# Patient Record
Sex: FEMALE | Race: WHITE | Employment: OTHER | URBAN - METROPOLITAN AREA
[De-identification: names, ages, dates, MRNs, and addresses within clinical notes are randomized per-mention and may not be internally consistent; named-entity substitution may affect disease eponyms.]

---

## 2018-01-26 PROBLEM — E11.42 DIABETIC POLYNEUROPATHY ASSOCIATED WITH TYPE 2 DIABETES MELLITUS (HCC): Status: ACTIVE | Noted: 2018-01-26

## 2018-01-26 PROBLEM — I70.0 AORTIC ATHEROSCLEROSIS (HCC): Status: ACTIVE | Noted: 2018-01-26

## 2018-01-26 PROBLEM — F32.4 MAJOR DEPRESSIVE DISORDER WITH SINGLE EPISODE, IN PARTIAL REMISSION (HCC): Status: ACTIVE | Noted: 2018-01-26

## 2018-01-26 PROBLEM — Z85.3 HISTORY OF BREAST CANCER: Status: ACTIVE | Noted: 2018-01-26

## 2018-01-26 PROBLEM — L89.610 PRESSURE SORE ON HEEL, RIGHT, UNSTAGEABLE (HCC): Status: ACTIVE | Noted: 2018-01-26

## 2019-05-02 ENCOUNTER — HOSPITAL ENCOUNTER (EMERGENCY)
Facility: HOSPITAL | Age: 84
Discharge: HOME | End: 2019-05-02
Attending: EMERGENCY MEDICINE
Payer: MEDICARE

## 2019-05-02 VITALS
SYSTOLIC BLOOD PRESSURE: 155 MMHG | WEIGHT: 145 LBS | BODY MASS INDEX: 25.69 KG/M2 | DIASTOLIC BLOOD PRESSURE: 64 MMHG | HEART RATE: 95 BPM | OXYGEN SATURATION: 99 % | TEMPERATURE: 98 F | RESPIRATION RATE: 20 BRPM | HEIGHT: 63 IN

## 2019-05-02 DIAGNOSIS — L02.91 ABSCESS: Primary | ICD-10-CM

## 2019-05-02 PROCEDURE — 10060 I&D ABSCESS SIMPLE/SINGLE: CPT | Performed by: EMERGENCY MEDICINE

## 2019-05-02 PROCEDURE — 99283 EMERGENCY DEPT VISIT LOW MDM: CPT | Performed by: EMERGENCY MEDICINE

## 2019-05-02 RX ORDER — LIDOCAINE HYDROCHLORIDE AND EPINEPHRINE 20; 5 MG/ML; UG/ML
1 INJECTION, SOLUTION EPIDURAL; INFILTRATION; INTRACAUDAL; PERINEURAL ONCE
Status: DISCONTINUED | OUTPATIENT
Start: 2019-05-02 | End: 2019-05-02

## 2019-05-02 RX ORDER — LIDOCAINE HYDROCHLORIDE AND EPINEPHRINE 20; 5 MG/ML; UG/ML
INJECTION, SOLUTION EPIDURAL; INFILTRATION; INTRACAUDAL; PERINEURAL
Status: COMPLETED
Start: 2019-05-02 | End: 2019-05-02

## 2019-05-02 RX ORDER — SULFAMETHOXAZOLE AND TRIMETHOPRIM 800; 160 MG/1; MG/1
1 TABLET ORAL 2 TIMES DAILY
Qty: 10 TABLET | Refills: 0 | Status: SHIPPED | OUTPATIENT
Start: 2019-05-02 | End: 2019-05-07

## 2019-05-02 RX ORDER — LIDOCAINE HYDROCHLORIDE AND EPINEPHRINE 20; 5 MG/ML; UG/ML
10 INJECTION, SOLUTION EPIDURAL; INFILTRATION; INTRACAUDAL; PERINEURAL ONCE
Status: COMPLETED | OUTPATIENT
Start: 2019-05-02 | End: 2019-05-02

## 2019-05-02 NOTE — ED PROVIDER NOTES
Patient Seen in: [facility] AND [facility] Emergency Department    History   Patient presents with:  Abscess (integumentary)    Stated Complaint: Skin Issue    HPI    She presents today to the emergency department with complaint of boil to the back.   She has had HPI.  Constitutional and vital signs reviewed. All other systems reviewed and negative except as noted above. PSFH elements reviewed from today and agreed except as otherwise stated in HPI.     Physical Exam     ED Triage Vitals [05/02/19 1110]   BP MDM     99% Normal  Pulse oximetry         Disposition and Plan     We recommend that you schedule follow up care with a primary care provider within the next three months to obtain basic health screening including reassessment of your blood pressure.

## 2019-05-02 NOTE — ED NOTES
Abscess/swelling noted to mid back, erythema noted as well, patient denies fever, pain with palpation to abscess

## 2019-06-01 ENCOUNTER — APPOINTMENT (OUTPATIENT)
Dept: GENERAL RADIOLOGY | Facility: HOSPITAL | Age: 84
DRG: 871 | End: 2019-06-01
Attending: EMERGENCY MEDICINE
Payer: MEDICARE

## 2019-06-01 ENCOUNTER — HOSPITAL ENCOUNTER (INPATIENT)
Facility: HOSPITAL | Age: 84
LOS: 5 days | Discharge: NURSING, REHAB OR LONG-TERM CARE | DRG: 871 | End: 2019-06-06
Attending: EMERGENCY MEDICINE | Admitting: INTERNAL MEDICINE
Payer: MEDICARE

## 2019-06-01 ENCOUNTER — APPOINTMENT (OUTPATIENT)
Dept: CT IMAGING | Facility: HOSPITAL | Age: 84
DRG: 871 | End: 2019-06-01
Attending: EMERGENCY MEDICINE
Payer: MEDICARE

## 2019-06-01 DIAGNOSIS — R65.20 SEVERE SEPSIS (HCC): Primary | ICD-10-CM

## 2019-06-01 DIAGNOSIS — A41.9 SEVERE SEPSIS (HCC): Primary | ICD-10-CM

## 2019-06-01 PROCEDURE — 74177 CT ABD & PELVIS W/CONTRAST: CPT | Performed by: EMERGENCY MEDICINE

## 2019-06-01 PROCEDURE — 87070 CULTURE OTHR SPECIMN AEROBIC: CPT | Performed by: HOSPITALIST

## 2019-06-01 PROCEDURE — 85025 COMPLETE CBC W/AUTO DIFF WBC: CPT | Performed by: EMERGENCY MEDICINE

## 2019-06-01 PROCEDURE — 87641 MR-STAPH DNA AMP PROBE: CPT | Performed by: HOSPITALIST

## 2019-06-01 PROCEDURE — 93010 ELECTROCARDIOGRAM REPORT: CPT | Performed by: EMERGENCY MEDICINE

## 2019-06-01 PROCEDURE — 96361 HYDRATE IV INFUSION ADD-ON: CPT

## 2019-06-01 PROCEDURE — 80076 HEPATIC FUNCTION PANEL: CPT | Performed by: EMERGENCY MEDICINE

## 2019-06-01 PROCEDURE — 93005 ELECTROCARDIOGRAM TRACING: CPT

## 2019-06-01 PROCEDURE — 96367 TX/PROPH/DG ADDL SEQ IV INF: CPT

## 2019-06-01 PROCEDURE — 81001 URINALYSIS AUTO W/SCOPE: CPT | Performed by: EMERGENCY MEDICINE

## 2019-06-01 PROCEDURE — 83690 ASSAY OF LIPASE: CPT | Performed by: EMERGENCY MEDICINE

## 2019-06-01 PROCEDURE — 87077 CULTURE AEROBIC IDENTIFY: CPT | Performed by: EMERGENCY MEDICINE

## 2019-06-01 PROCEDURE — 99291 CRITICAL CARE FIRST HOUR: CPT

## 2019-06-01 PROCEDURE — 87205 SMEAR GRAM STAIN: CPT | Performed by: HOSPITALIST

## 2019-06-01 PROCEDURE — 96375 TX/PRO/DX INJ NEW DRUG ADDON: CPT

## 2019-06-01 PROCEDURE — 82962 GLUCOSE BLOOD TEST: CPT

## 2019-06-01 PROCEDURE — 80048 BASIC METABOLIC PNL TOTAL CA: CPT | Performed by: EMERGENCY MEDICINE

## 2019-06-01 PROCEDURE — 87077 CULTURE AEROBIC IDENTIFY: CPT | Performed by: HOSPITALIST

## 2019-06-01 PROCEDURE — 87040 BLOOD CULTURE FOR BACTERIA: CPT | Performed by: EMERGENCY MEDICINE

## 2019-06-01 PROCEDURE — 87186 SC STD MICRODIL/AGAR DIL: CPT | Performed by: EMERGENCY MEDICINE

## 2019-06-01 PROCEDURE — 96365 THER/PROPH/DIAG IV INF INIT: CPT

## 2019-06-01 PROCEDURE — 83605 ASSAY OF LACTIC ACID: CPT | Performed by: EMERGENCY MEDICINE

## 2019-06-01 PROCEDURE — 83036 HEMOGLOBIN GLYCOSYLATED A1C: CPT | Performed by: HOSPITALIST

## 2019-06-01 PROCEDURE — 71045 X-RAY EXAM CHEST 1 VIEW: CPT | Performed by: EMERGENCY MEDICINE

## 2019-06-01 PROCEDURE — 36415 COLL VENOUS BLD VENIPUNCTURE: CPT

## 2019-06-01 PROCEDURE — 87150 DNA/RNA AMPLIFIED PROBE: CPT | Performed by: EMERGENCY MEDICINE

## 2019-06-01 RX ORDER — SODIUM CHLORIDE 0.9 % (FLUSH) 0.9 %
3 SYRINGE (ML) INJECTION AS NEEDED
Status: DISCONTINUED | OUTPATIENT
Start: 2019-06-01 | End: 2019-06-06

## 2019-06-01 RX ORDER — ONDANSETRON 2 MG/ML
4 INJECTION INTRAMUSCULAR; INTRAVENOUS ONCE
Status: COMPLETED | OUTPATIENT
Start: 2019-06-01 | End: 2019-06-01

## 2019-06-01 RX ORDER — CALCIUM CARBONATE 200(500)MG
1000 TABLET,CHEWABLE ORAL 3 TIMES DAILY PRN
Status: DISCONTINUED | OUTPATIENT
Start: 2019-06-01 | End: 2019-06-06

## 2019-06-01 RX ORDER — SODIUM CHLORIDE, SODIUM LACTATE, POTASSIUM CHLORIDE, CALCIUM CHLORIDE 600; 310; 30; 20 MG/100ML; MG/100ML; MG/100ML; MG/100ML
INJECTION, SOLUTION INTRAVENOUS CONTINUOUS
Status: DISCONTINUED | OUTPATIENT
Start: 2019-06-01 | End: 2019-06-03

## 2019-06-01 RX ORDER — DEXTROSE MONOHYDRATE 25 G/50ML
50 INJECTION, SOLUTION INTRAVENOUS AS NEEDED
Status: DISCONTINUED | OUTPATIENT
Start: 2019-06-01 | End: 2019-06-06

## 2019-06-01 RX ORDER — AMLODIPINE BESYLATE 5 MG/1
5 TABLET ORAL
Status: DISCONTINUED | OUTPATIENT
Start: 2019-06-02 | End: 2019-06-01

## 2019-06-01 RX ORDER — ENOXAPARIN SODIUM 100 MG/ML
30 INJECTION SUBCUTANEOUS EVERY 24 HOURS
Status: DISCONTINUED | OUTPATIENT
Start: 2019-06-01 | End: 2019-06-02

## 2019-06-01 RX ORDER — ACETAMINOPHEN 325 MG/1
650 TABLET ORAL EVERY 6 HOURS PRN
Status: DISCONTINUED | OUTPATIENT
Start: 2019-06-01 | End: 2019-06-06

## 2019-06-01 RX ORDER — METOCLOPRAMIDE HYDROCHLORIDE 5 MG/ML
10 INJECTION INTRAMUSCULAR; INTRAVENOUS ONCE
Status: COMPLETED | OUTPATIENT
Start: 2019-06-01 | End: 2019-06-01

## 2019-06-01 RX ORDER — ACETAMINOPHEN 10 MG/ML
1000 INJECTION, SOLUTION INTRAVENOUS ONCE
Status: COMPLETED | OUTPATIENT
Start: 2019-06-01 | End: 2019-06-01

## 2019-06-01 RX ORDER — CALCIUM CARBONATE 200(500)MG
2 TABLET,CHEWABLE ORAL EVERY 6 HOURS PRN
COMMUNITY

## 2019-06-01 RX ORDER — POTASSIUM CHLORIDE 14.9 MG/ML
20 INJECTION INTRAVENOUS ONCE
Status: COMPLETED | OUTPATIENT
Start: 2019-06-02 | End: 2019-06-02

## 2019-06-01 RX ORDER — ONDANSETRON 2 MG/ML
4 INJECTION INTRAMUSCULAR; INTRAVENOUS EVERY 6 HOURS PRN
Status: DISCONTINUED | OUTPATIENT
Start: 2019-06-01 | End: 2019-06-06

## 2019-06-01 NOTE — ED PROVIDER NOTES
Patient Seen in: [facility] ER    History   Patient presents with:  Hypertension (cardiovascular)  Nausea/vomiting    Stated Complaint: vomiting    HPI    80year old with past medical history of diabetes, GERD, hypertension who presents with high Constitutional: She is oriented to person, place, and time. She appears well-developed and well-nourished. She is cooperative. Non-toxic appearance. She appears ill. She appears distressed.    Pt sitting up in bed dry-heaving into emesis basin with only ye LACTIC ACID, PLASMA - Abnormal; Notable for the following components:    Lactic Acid 4.3 (*)     All other components within normal limits   LACTIC ACID 3 HR POST POSITIVE - Abnormal; Notable for the following components:    Lactic Acid 2.3 (*)     All oth , sinus, normal for rate and rhythm     Radiology findings: Xr Chest Ap Portable  (cpt=71045)    Result Date: 6/1/2019  CONCLUSION:  1. No acute airspace disease. Mild bibasilar scarring/atelectasis. Mild cardiomegaly and normal pulmonary vascularity.   [place] CONCLUSION:  1. Long segment colonic wall thickening extending from the cecum through the splenic flexure, suspicious for infectious/inflammatory colitis given clinical context.   There are additional findings that can be seen in the setting of infectious/i Critical Care:  I spent a total of 30 minutes of critical care time in obtaining history, performing a physical exam, bedside monitoring of interventions, collecting and interpreting tests and discussion with consultants but not including time spent perform sodium chloride 0.9% IV bolus 1,974 mL (0 mL/kg × 65.8 kg Intravenous Stopped 6/1/19 1514)   Metoclopramide HCl (REGLAN) injection 10 mg (10 mg Intravenous Given 6/1/19 1129)   acetaminophen (OFIRMEV) infusion 1,000 mg (1,000 mg Intravenous Given 6/1/19 11 * (Principal) Severe sepsis ([facility].) A41.9, R65.20 6/1/2019 Unknown            Present on Admission  Date Reviewed: 1/27/2018          ICD-10-CM Noted POA    * (Principal) Severe sepsis ([facility].) A41.9, R65.20 6/1/2019 Unknown

## 2019-06-01 NOTE — ED NOTES
PATIENT DENIES NAUSEA/VOMITING AT THIS TIME.  VSS. FAMILY AT THE BEDSIDE. PATIENT DENIES PAIN. IVF NS CONTINUE INFUSING. WILL CONTINUE TO MONITOR AND REPEAT LACTIC AT 1400.

## 2019-06-01 NOTE — ED INITIAL ASSESSMENT (HPI)
Patient sent from [place] [id number] for elevated bp and \"shaking\" \  On arrival to ED patient is complaining of nausea and vomited here. Patient denies any pain. No sob. No chest pain. Patient is a/ox4.

## 2019-06-01 NOTE — ED NOTES
PATIENT TO GO TO CT VIA CART WITH NIXON RN AND TRANSPORTER. Ctra. Orlando Ferraro CCU RN NOTIFIED.    AFTER CT PATIENT WILL GO TO UNIT

## 2019-06-01 NOTE — PROGRESS NOTES
[facility] Pharmacy Note:  Renal Dose Adjustment for Enoxaparin (LOVENOX)    Carisa Brown has been prescribed Enoxaparin (LOVENOX) 40 mg subcutaneously every 24 hours. Estimated Creatinine Clearance: 22.2 mL/min (A) (based on SCr of 1.14 mg/dL (H)).     Her ca

## 2019-06-01 NOTE — PROGRESS NOTES
[facility]      Sepsis Reassessment Note    /50   Pulse 94   Temp 99.3 °F (37.4 °C) (Oral)   Resp 23   Ht 5' 3\" (1.6 m)   Wt 145 lb (65.8 kg)   SpO2 90%   BMI 25.69 kg/m²      3:28 PM    Cardiac:  Regularity: Regular  Rate: Hattie Hanson

## 2019-06-01 NOTE — CONSULTS
[facility] Pulmonary, Critical Care and Sleep    Carolina Landin Patient Status:  Inpatient    1920 MRN [id number]   Location 215-A PCP Vonna Apgar, MD     Date of Admission: 2019  History of Present Illness: Pt is a 80year old female con Ondansetron HCl (ZOFRAN) 4 mg tablet Take 4 mg by mouth every 6 (six) hours as needed for Nausea.    Disp:  Rfl:      Inpt meds:  • enoxaparin  30 mg Subcutaneous Q24H   • piperacillin-tazobactam  3.375 g Intravenous Q8H   • Insulin Aspart Pen  1-5 Units Mims Anion Gap 13 0 - 18 mmol/L    BUN 14 7 - 18 mg/dL    Creatinine 1.14 (H) 0.55 - 1.02 mg/dL    BUN/CREA Ratio 12.3 10.0 - 20.0    Calcium, Total 9.0 8.5 - 10.1 mg/dL    Calculated Osmolality 293 275 - 295 mOsm/kg    GFR, Non- 40 (L) >=60 Urine Color Yellow Yellow    Clarity Urine Clear Clear    Spec Gravity 1.013 1.002 - 1.035    pH Urine 6.0 5.0 - 8.0    Protein Urine 100  (A) Negative mg/dL    Glucose Urine 150  (A) Negative mg/dL    Ketones Urine Trace (A) Negative mg/dL    Bilirubin U 6. Approximate 1 cm additional cystic pancreatic body lesion.  Differential considerations include a pseudocyst (particularly if there is history of pancreatitis), cystic neoplasm, or dilated side branch radical.  Suggest follow-up MRCP (depending upon   go

## 2019-06-01 NOTE — H&P
[facility] Hospitalist H&P       CC: Patient presents with:  Hypertension (cardiovascular)  Nausea/vomiting       PCP: Stone Jacques MD    History of Present Illness: Patient is a 80year old female with PMH sig for HTN, GERD, DM (no longer on meds) wh Soc Hx  Social History    Tobacco Use      Smoking status: Never Smoker      Smokeless tobacco: Never Used    Alcohol use: No       Fam Hx  History reviewed. No pertinent family history.     Review of Systems  Comprehensive ROS reviewed and negative ASSESSMENT / PLAN:   Patient is a 80year old female with PMH sig for HTN, GERD, DM (no longer on meds) who presents with fevers, and rigors. Severe sepsis / wound infection?  Vs other / lactic acidosis / leucocytosis   - Fever, Tachy, WBC14,

## 2019-06-01 NOTE — PROGRESS NOTES
[facility] Dosing Service    Initial Pharmacokinetic Consult for Vancomycin Dosing     Catia Gonzalez is a 80year old female who is being treated for sepsis.   Pharmacy has been asked to dose Vancomycin by Dr. Sean De Luna    She is allergic to cefuroxime and phe

## 2019-06-01 NOTE — ED NOTES
REPORT GIVEN TO Ctra. Orlando Ferraro CCU RN. TRANSPORT REQUESTED.   PATIENT FAMILY AT THE BEDSIDE AND UPDATED ON TRANSFER TO CCU

## 2019-06-02 PROCEDURE — 87493 C DIFF AMPLIFIED PROBE: CPT | Performed by: HOSPITALIST

## 2019-06-02 PROCEDURE — 82962 GLUCOSE BLOOD TEST: CPT

## 2019-06-02 PROCEDURE — 83036 HEMOGLOBIN GLYCOSYLATED A1C: CPT | Performed by: HOSPITALIST

## 2019-06-02 PROCEDURE — 85025 COMPLETE CBC W/AUTO DIFF WBC: CPT | Performed by: HOSPITALIST

## 2019-06-02 PROCEDURE — 83605 ASSAY OF LACTIC ACID: CPT | Performed by: INTERNAL MEDICINE

## 2019-06-02 PROCEDURE — 87040 BLOOD CULTURE FOR BACTERIA: CPT | Performed by: HOSPITALIST

## 2019-06-02 PROCEDURE — 83735 ASSAY OF MAGNESIUM: CPT | Performed by: HOSPITALIST

## 2019-06-02 PROCEDURE — 80053 COMPREHEN METABOLIC PANEL: CPT | Performed by: HOSPITALIST

## 2019-06-02 RX ORDER — HEPARIN SODIUM 5000 [USP'U]/ML
5000 INJECTION, SOLUTION INTRAVENOUS; SUBCUTANEOUS EVERY 12 HOURS SCHEDULED
Status: DISCONTINUED | OUTPATIENT
Start: 2019-06-03 | End: 2019-06-06

## 2019-06-02 RX ORDER — MAGNESIUM SULFATE HEPTAHYDRATE 40 MG/ML
2 INJECTION, SOLUTION INTRAVENOUS ONCE
Status: COMPLETED | OUTPATIENT
Start: 2019-06-02 | End: 2019-06-02

## 2019-06-02 NOTE — PLAN OF CARE
Problem: SAFETY ADULT - FALL  Goal: Free from fall injury  Description  INTERVENTIONS:  - Assess pt frequently for physical needs  - Identify cognitive and physical deficits and behaviors that affect risk of falls.   - [place] fall precautions as indica urine. Given tylenol for sleep and effective. Plan of care addressed with limited understanding.

## 2019-06-02 NOTE — PLAN OF CARE
Double RN skin check done prior to transfer off Unit. Skin check performed by this RN and Jen Billings RN. Wounds are as follows: Abscess with penrose drain to left back. Dressing C/D/I, excoriation/redness to lynn/buttock area with sensicare.  Dry callous to

## 2019-06-02 NOTE — PLAN OF CARE
Patient admitted from ED to room 215. Turning in bed with 2 assist. Patient is very afraid of falling, which son's state is 2/2 her falling in the past. Abcess with penrose drain noted to left back. Wound culture sent and dressing applied. MRSA swab sent. document risk factors for pressure ulcer development  - Assess and document skin integrity  - Monitor for areas of redness and/or skin breakdown  - Initiate interventions, skin care algorithm/standards of care as needed  Outcome: Progressing     Problem: P

## 2019-06-02 NOTE — PROGRESS NOTES
[facility] Hospitalist Progress Note     CC: Hospital Follow up    PCP: Tamar Arndt MD       Assessment/Plan:     Principal Problem:    Severe sepsis [facility])    Patient is a 80year old female with PMH sig for HTN, GERD, DM (no longer on meds) who presen am    OBJECTIVE:    Blood pressure (!) 137/115, pulse 101, temperature 98 °F (36.7 °C), temperature source Temporal, resp. rate 18, height 5' 3\" (1.6 m), weight 160 lb (72.6 kg), SpO2 92 %.     Temp:  [97.9 °F (36.6 °C)-100.2 °F (37.9 °C)] 98 °F (36.7 °C) mass must be excluded. Recommend CT scanning with contrast to further assess. Moderate retrocardiac hiatal hernia.     Dictated by (CST): Alexa Ugarte MD on 6/01/2019 at 11:54     Approved by (CST): Alexa Ugarte MD on 6/01/2019 at 11:56          Ct Ab Meds:     • magnesium sulfate  2 g Intravenous Once   • enoxaparin  30 mg Subcutaneous Q24H   • piperacillin-tazobactam  3.375 g Intravenous Q8H   • Insulin Aspart Pen  1-5 Units Subcutaneous TID CC   • [START ON 6/3/2019] vancomycin  15 mg/kg Intr

## 2019-06-02 NOTE — PROGRESS NOTES
[facility] Pulmonary, Critical Care and Sleep    Fidelia Lobato Patient Status:  Inpatient    1920 MRN [id number]   Location [facility] 3W/SW Attending Maria Luz Delcid MD   Hosp Day # 1 PCP Claudean Palmer, MD       Date of Admission: 2019 10:4  110   CO2 23.0 22.0     No results for input(s): INR, PTT in the last 168 hours. No results for input(s): ABGPHT, QZSUWG9K, BPOIB1H, ABGHCO3, SITE, DEV, THGB in the last 168 hours. Pseudomonas PCR pos  Cx NGTD. ASSESSMENT/PLAN:  1.  Sepsis seco

## 2019-06-02 NOTE — CONSULTS
1915 Bridgette Ross  CTD:[birth year]  AWF:[id number]  LOS:1    Date of Admission:  6/1/2019  Date of Consult:  6/2/2019     Reas Continuous  •  acetaminophen (TYLENOL) tab 650 mg, 650 mg, Oral, Q6H PRN  •  ondansetron HCl (ZOFRAN) injection 4 mg, 4 mg, Intravenous, Q6H PRN  •  Piperacillin Sod-Tazobactam So (ZOSYN) 3.375 g in dextrose 5 % 100 mL ADD-vantage, 3.375 g, Intravenous, Q8 NEPRELIM 13.20* 10.11*   WBC 14.3* 11.1*   .0 182.0       Recent Labs   Lab 06/01/19  1056 06/02/19  0339   * 158*   BUN 14 10   CREATSERUM 1.14* 0.94   GFRAA 46* 58*   GFRNAA 40* 50*   CA 9.0 7.7*    139   K 3.2* 4.1    110   C interpolar hypodense renal lesion. Suggest nonemergent follow-up renal ultrasound to determine if this is solid or cystic. 6. Approximate 1 cm additional cystic pancreatic body lesion.  Differential considerations include a pseudocyst (particularly if ther AM                Time spent on counseling/coordination of care:  15 Minutes  Total time spent with patient:  30 Minutes

## 2019-06-02 NOTE — PLAN OF CARE
Patient looks and feels better this morning. IVF reduced to 42cc/hr. Patient tolerating clears and advanced per orders. Blood cultures + and relayed to Dr. Mario Shaw. Patient transferred to room 304. Son, Ed notified.  Patient sent with all belongings including breakdown  - Initiate interventions, skin care algorithm/standards of care as needed  Outcome: Progressing  Goal: Incision(s), wounds(s) or drain site(s) healing without S/S of infection  Description  INTERVENTIONS:  - Assess and document risk factors for

## 2019-06-02 NOTE — CONSULTS
[facility] AND [facility] Infectious Disease  Report of Consultation    Fidelia Lobato Patient Status:  Inpatient    1920 MRN [id number]   Location [facility] 2W/SW Attending Maria Luz Delcid MD   Hosp Day # 1 PCP Claudean Palmer, MD     Date Oral, Q6H PRN  •  ondansetron HCl (ZOFRAN) injection 4 mg, 4 mg, Intravenous, Q6H PRN  •  Piperacillin Sod-Tazobactam So (ZOSYN) 3.375 g in dextrose 5 % 100 mL ADD-vantage, 3.375 g, Intravenous, Q8H  •  dextrose 50 % injection 50 mL, 50 mL, Intravenous, MA °F (36.6 °C) Temporal 69 23 95 % — —   06/02/19 0300 93/47 — — 69 25 95 % — —   06/02/19 0200 103/56 — — 79 23 97 % — —   06/02/19 0100 133/65 — — 98 23 93 % — —   06/02/19 0000 117/60 99 °F (37.2 °C) Temporal 98 26 94 % — —   06/01/19 2300 124/65 — — 98 2 06/02/2019    ALKPHO 61 06/02/2019    BILT 0.8 06/02/2019    TP 5.6 06/02/2019    AST 37 06/02/2019    ALT 21 06/02/2019    LIP 98 06/01/2019    MG 1.7 06/02/2019        Cultures:   1/2 blood cultures + pseudomonas, repeats negative  Wound cultures negaitv recently drained back cyst - cultures currently negative and not obtained at the time of drainage vs. Other occult source  - IV vancomycin and zosyn ongoing - will streamline quickly    2.   CT evidence of long segment colonic thickening  - Surgery has eval

## 2019-06-02 NOTE — PLAN OF CARE
Report received from [facility] in ED. Patient given fluid bolus according to sepsis protocol and IV antibiotics vanco and zosyn were given after blood and urine culture obtained.

## 2019-06-03 ENCOUNTER — APPOINTMENT (OUTPATIENT)
Dept: GENERAL RADIOLOGY | Facility: HOSPITAL | Age: 84
DRG: 871 | End: 2019-06-03
Attending: HOSPITALIST
Payer: MEDICARE

## 2019-06-03 ENCOUNTER — APPOINTMENT (OUTPATIENT)
Dept: CV DIAGNOSTICS | Facility: HOSPITAL | Age: 84
DRG: 871 | End: 2019-06-03
Attending: HOSPITALIST
Payer: MEDICARE

## 2019-06-03 PROCEDURE — 80048 BASIC METABOLIC PNL TOTAL CA: CPT | Performed by: HOSPITALIST

## 2019-06-03 PROCEDURE — 82805 BLOOD GASES W/O2 SATURATION: CPT | Performed by: HOSPITALIST

## 2019-06-03 PROCEDURE — 85025 COMPLETE CBC W/AUTO DIFF WBC: CPT | Performed by: HOSPITALIST

## 2019-06-03 PROCEDURE — 93306 TTE W/DOPPLER COMPLETE: CPT | Performed by: HOSPITALIST

## 2019-06-03 PROCEDURE — 36600 WITHDRAWAL OF ARTERIAL BLOOD: CPT | Performed by: HOSPITALIST

## 2019-06-03 PROCEDURE — 94640 AIRWAY INHALATION TREATMENT: CPT

## 2019-06-03 PROCEDURE — 83880 ASSAY OF NATRIURETIC PEPTIDE: CPT | Performed by: HOSPITALIST

## 2019-06-03 PROCEDURE — 82962 GLUCOSE BLOOD TEST: CPT

## 2019-06-03 PROCEDURE — 71045 X-RAY EXAM CHEST 1 VIEW: CPT | Performed by: HOSPITALIST

## 2019-06-03 PROCEDURE — 83735 ASSAY OF MAGNESIUM: CPT | Performed by: HOSPITALIST

## 2019-06-03 RX ORDER — IPRATROPIUM BROMIDE AND ALBUTEROL SULFATE 2.5; .5 MG/3ML; MG/3ML
3 SOLUTION RESPIRATORY (INHALATION) EVERY 6 HOURS PRN
Status: DISCONTINUED | OUTPATIENT
Start: 2019-06-03 | End: 2019-06-06

## 2019-06-03 RX ORDER — FUROSEMIDE 10 MG/ML
40 INJECTION INTRAMUSCULAR; INTRAVENOUS
Status: DISCONTINUED | OUTPATIENT
Start: 2019-06-03 | End: 2019-06-03

## 2019-06-03 RX ORDER — FUROSEMIDE 10 MG/ML
20 INJECTION INTRAMUSCULAR; INTRAVENOUS ONCE
Status: COMPLETED | OUTPATIENT
Start: 2019-06-03 | End: 2019-06-03

## 2019-06-03 RX ORDER — FUROSEMIDE 10 MG/ML
20 INJECTION INTRAMUSCULAR; INTRAVENOUS
Status: DISCONTINUED | OUTPATIENT
Start: 2019-06-03 | End: 2019-06-06

## 2019-06-03 NOTE — PHYSICAL THERAPY NOTE
Pt was to be seen for PT eval. Consulted w/ RN. Visited pt in room. Pt was received supine in bed. Despite moderate education and encouragement, pt declined participating with therapy today.  Will re-attempt time-permitting if pt is agreeable to working wit

## 2019-06-03 NOTE — PROGRESS NOTES
[facility] Hospitalist Progress Note     CC: Hospital Follow up    PCP: Grant Saravia MD       Assessment/Plan:     Principal Problem:    Severe sepsis [facility])    Patient is a 80year old female with PMH sig for HTN, GERD, DM (no longer on meds) who presen heparin  Atrophy: IS  Lines: PIV     Dispo: pending clinical course    Questions/concerns were discussed with patient and/or family by bedside.     Thank Radha Ulloa MD    [facility] Hospitalist     Subjective:     Hypoxic last night and this am, denies feelin K 3.2* 4.1 3.9    110 105   CO2 23.0 22.0 24.0       Recent Labs   Lab 06/01/19  1056 06/02/19  0339   ALT 19 21   AST 22 37   ALB 3.4 2.4*         Imaging:  Xr Chest Ap Portable  (cpt=71045)    Result Date: 6/1/2019  CONCLUSION:  1.  No acute airsp of care. 8. Calcified/degenerated uterine fibroids. 9. Coronary and peripheral atherosclerosis. 10. Postsurgical changes of right proximal femoral internal fixation. Resultant streak artifacts limit assessment.  11. Chronic/healed left rib and left inferio

## 2019-06-03 NOTE — OCCUPATIONAL THERAPY NOTE
Orders received, chart reviewed. ELENA Ni cleared pt for participation in OT evaluation. Upon arrival, pt in bed. Pt SOB with talking. Pt strongly deferring activity at this time despite pt education on benefits for out of bed activity.  Pt repositioned in

## 2019-06-03 NOTE — PAYOR COMM NOTE
--------------  ADMISSION REVIEW     Payor: [facility] [place] #:  [id number]  Authorization Number: N/A    Admit date: 6/1/19  Admit time: 7810       Patient Seen in: [facility] AND [facility] ER    History   Patient presents with:  Hypertensio Neck: Normal range of motion and full passive range of motion without pain. Neck supple. No neck rigidity. Normal range of motion present. Cardiovascular: Regular rhythm, normal heart sounds and intact distal pulses. Tachycardia present.    No murmur hear EKG SR/ with PVC, poor baseline due to tremor    MDM   CXR: 1. No acute airspace disease. Mild bibasilar scarring/atelectasis. Mild cardiomegaly and normal pulmonary vascularity.   Moderate tracheal deviation to the left may be secondary to a subste Sepsis Reassessment Note    /53    Pulse 92   Temp 98.2  Resp 14   Ht 160 cm (5' 3\")   Wt 72.3 kg   SpO2 94%       Medications   Calcium Carbonate Antacid (TUMS) chewable tab 1,000 mg (has no administration in time range)   Normal Saline Flush 0.9 % The patient presents with signs of sepsis, fever, tachycardia, on labs she does have slightly elevated WBC as well as lactic acidosis. The patient's blood pressure remained stable and if anything was elevated.   The patient had significant improvement with Lab 06/01/19  1056   WBC 14.3*   HGB 12.2   MCV 84.8   .0     Lab 06/01/19  1056      K 3.2*      CO2 23.0   BUN 14   CREATSERUM 1.14*   *   CA 9.0     ALT 19   AST 22   ALB 3.4       ASSESSMENT / PLAN:   Patient is a 80year old - back wound with drain in place and active drainage, no pain with palpation, mild erythema surrounding, culture pending  - s/p IVF sepsis bolus, continue IVF, LA cleared  - IV vanc and IV zosyn for now  - follow cultures  - surgery eval, dressing recommen MEDICATIONS ADMINISTERED IN LAST 1 DAY:  acetaminophen (TYLENOL) tab 650 mg     Date Action Dose Route User    6/2/2019 2011 Given 650 mg Oral Harvey REYES RN      Calcium Carbonate Antacid (TUMS) chewable tab 1,000 mg     Date Action Dose Route User

## 2019-06-03 NOTE — PROGRESS NOTES
[facility] AND [facility] Infectious Disease  Progress Note    Priscilla Meter Patient Status:  Inpatient    1920 MRN [id number]   Location [facility] 3W/SW Attending Igor Lo MD   Hosp Day # 2 PCP Van Fonseca MD     Subjective:  Jessica Sandy retrocardiac hiatal hernia. 4. Cholecystectomy. 5. Indeterminate approximate 1 cm left lateral interpolar hypodense renal lesion.  Suggest nonemergent follow-up renal ultrasound to determine if this is solid or cystic.   6. Approximate 1 cm additional cys pending further data. Will follow. [facility] Infectious Disease  [phone]    6/3/2019  10:59 AM

## 2019-06-03 NOTE — DIETARY NOTE
ADULT NUTRITION INITIAL ASSESSMENT    Pt is at moderate nutrition risk. Pt does not meet malnutrition criteria.       RECOMMENDATIONS TO MD:  See Nutrition Intervention     NUTRITION DIAGNOSIS/PROBLEM:  Inadequate oral intake related to decreased appetite ANTHROPOMETRICS:  HT: 160 cm (5' 3\")  WT: 73.2 kg (161 lb 6.4 oz)   BMI: Body mass index is 28.59 kg/m².   BMI CLASSIFICATION: 27-29.9 kg/m2 - overweight for advanced age  IBW: 115 lbs        140% IBW  Usual Body Wt: 145 lbs       111% UBW    WEIGHT HI excoriation/fragile/red of perineum; posterior abcess drainage with penrose drain in place to back     - Anthony score 13 reflects high risk for skin breakdown     NUTRITION PRESCRIPTION:  Diet: Chopped  Oral Supplements: Ensure Enlive BID with breakfast an

## 2019-06-03 NOTE — PROGRESS NOTES
Pulmonary Progress Note     Assessment / Plan:  1. Acute respiratory failure - likely due to pulm edema. DDx also includes aspiration  - wean O2 as able; was up to 10L HFNC this morning  - IV lasix  - zosyn  2.  Sepsis - due to pseudomonas bacteremia  - abx

## 2019-06-03 NOTE — CM/SW NOTE
Case Management/ Progression of Care     Met with patient at the bedside for discharge planning. Patient is a 80year old female who lives at [facility].  [phone]   Patient was admitted thru the ED following a recent   Out devan

## 2019-06-04 ENCOUNTER — APPOINTMENT (OUTPATIENT)
Dept: PHYSICAL THERAPY | Facility: HOSPITAL | Age: 84
DRG: 871 | End: 2019-06-04
Attending: HOSPITALIST
Payer: MEDICARE

## 2019-06-04 PROCEDURE — 82962 GLUCOSE BLOOD TEST: CPT

## 2019-06-04 PROCEDURE — 84132 ASSAY OF SERUM POTASSIUM: CPT | Performed by: INTERNAL MEDICINE

## 2019-06-04 PROCEDURE — 80048 BASIC METABOLIC PNL TOTAL CA: CPT | Performed by: HOSPITALIST

## 2019-06-04 PROCEDURE — 83735 ASSAY OF MAGNESIUM: CPT | Performed by: HOSPITALIST

## 2019-06-04 PROCEDURE — 94640 AIRWAY INHALATION TREATMENT: CPT

## 2019-06-04 PROCEDURE — 85025 COMPLETE CBC W/AUTO DIFF WBC: CPT | Performed by: HOSPITALIST

## 2019-06-04 PROCEDURE — 97530 THERAPEUTIC ACTIVITIES: CPT

## 2019-06-04 PROCEDURE — 97166 OT EVAL MOD COMPLEX 45 MIN: CPT

## 2019-06-04 PROCEDURE — 97162 PT EVAL MOD COMPLEX 30 MIN: CPT

## 2019-06-04 RX ORDER — POTASSIUM CHLORIDE 20 MEQ/1
40 TABLET, EXTENDED RELEASE ORAL EVERY 4 HOURS
Status: COMPLETED | OUTPATIENT
Start: 2019-06-04 | End: 2019-06-04

## 2019-06-04 NOTE — PROGRESS NOTES
[facility] - [facility]    Progress Note    Carisa Kevin Patient Status:  Inpatient    1920 MRN [id number]   Location [facility] 3W/SW Attending Miguelito Jerome MD   Hosp Day # 3 PCP Benito Mandel MD            Subjective:     Stat 1.14* 0.94 0.96 1.02   GFRAA 46* 58* 57* 53*   GFRNAA 40* 50* 49* 46*   CA 9.0 7.7* 8.5 8.1*   ALB 3.4 2.4*  --   --     139 137 136   K 3.2* 4.1 3.9 3.3*    110 105 103   CO2 23.0 22.0 24.0 25.0   ALKPHO 92 61  --   --    AST 22 37  --   --

## 2019-06-04 NOTE — OCCUPATIONAL THERAPY NOTE
OCCUPATIONAL THERAPY EVALUATION - INPATIENT     Room Number: 920/789-W  Evaluation Date: 6/4/2019  Type of Evaluation: Initial  Presenting Problem: acute resp failure, sepsis    Physician Order: IP Consult to Occupational Therapy  Reason for Therapy: ADL/I with lift sheet and total A x 2. Pt reported she screamed because she was scared and a \"big baby\".      The patient's Approx Degree of Impairment: 56.46% has been calculated based on documentation in the [facility] '6 clicks' Inpatient Daily Activity Short For behavioral screaming?     Communication: wfl    Behavioral/Emotional/Social: wfl    RANGE OF MOTION   Upper extremity ROM is within functional limits except for the following: bilateral shoulders --per observation     STRENGTH ASSESSMENT  Upper extremity st

## 2019-06-04 NOTE — PLAN OF CARE
Problem: Patient/Family Goals  Goal: Patient/Family Long Term Goal  Description  Patient's Long Term Goal: To go home    Interventions:  - Admission for sepsis  - Cultures pending  - Consult to Pulmonary and Surgery  - IVF, Abx  - See additional Care Jeronimo Initiate interventions, skin care algorithm/standards of care as needed  Outcome: Progressing  Goal: Incision(s), wounds(s) or drain site(s) healing without S/S of infection  Description  INTERVENTIONS:  - Assess and document risk factors for pressure ulce

## 2019-06-04 NOTE — CM/SW NOTE
Case Management/ Progression of Care    Therapy is recommending [facility], tentative referral sent to   [facility] per family request.      DON [facility] from [facility] [place]  Patient and family aware and in agreement with referral sent.

## 2019-06-04 NOTE — PROGRESS NOTES
[facility] AND [facility] Infectious Disease  Progress Note    Donald Mendez Patient Status:  Inpatient    1920 MRN [id number]   Location [facility] 3W/SW Attending Ramsey Ying MD   Hosp Day # 3 PCP Napoleon Jackson MD     Subjective:  Louise Alanis evidence of acute diverticulitis. 3. Moderate to large retrocardiac hiatal hernia. 4. Cholecystectomy.   5. Indeterminate approximate 1 cm left lateral interpolar hypodense renal lesion.  Suggest nonemergent follow-up renal ultrasound to determine if this wound care ongoing. D/w patient.     [facility] Infectious Disease  [phone]    6/4/2019  1:19 PM

## 2019-06-04 NOTE — PHYSICAL THERAPY NOTE
PHYSICAL THERAPY EVALUATION - INPATIENT     Room Number: 375/429-X  Evaluation Date: 6/4/2019  Type of Evaluation: Initial   Physician Order: PT Eval and Treat    Presenting Problem: severe sepsis; recent cyst drained from back as an OPPT w/ small drain a recommending sub-acute rehab at hospital as pt was able to transfer to < > from wheelchair independently at baseline and she has been unable to demonstrate that she can do this now. Pt is below her functional mobility baseline.      Patient will benefit fro Prior Level of [place]: Per pt: Pt was able to transfer self to < > from wheelchair. Pt needs assistance for dressing, medication management, bathing, and meals. She is wheelchair bound.  She has been non-ambulatory for the last 2 years since her h Patient is able to demonstrate supine - sit EOB @ level: modified independent     Goal #1   Current Status    Goal #2 Patient is able to demonstrate transfers EOB to/from Chair/Wheelchair at assistance level: modified independent with none     Goal #2  Cur

## 2019-06-04 NOTE — PROGRESS NOTES
[facility] Hospitalist Progress Note     CC: Hospital Follow up    PCP: Johnathan Pemberton MD       Assessment/Plan:     Principal Problem:    Severe sepsis [facility])    Ms. Talmadge Skiff is a 80year old female with PMH sig for HTN, GERD, DM (no longer on meds) who pre PCP, can consider mammogram    #Renal and pancreatic cyst/mass  - consider MRI as outpatient after discussion with PCP     #HTN  - hold amlodipine, if needed would switch to BB at time of discahrge     #DM  - not on oral meds  -  on admit, likely due 84.0   MCH 27.4 27.3 27.3   MCHC 32.5 32.1 32.5   RDW 14.6 14.6 14.5   NEPRELIM 10.11* 9.80* 4.75   WBC 11.1* 11.0 6.8   .0 187.0 174.0         Recent Labs   Lab 06/02/19  0339 06/03/19  0551 06/04/19  0606   * 185* 160*   BUN 10 13 16   CREA

## 2019-06-04 NOTE — PROGRESS NOTES
Pulmonary Progress Note     Assessment / Plan:  1. Acute respiratory failure - due to pulm edema. Less likely aspiration pneumonia  - wean O2 as able  - IV lasix  - zosyn  2. HFpEF - TTE with grade 2 DD and severe MR  - diuresis  3.  Sepsis - due to pseudom

## 2019-06-05 PROCEDURE — 83735 ASSAY OF MAGNESIUM: CPT | Performed by: INTERNAL MEDICINE

## 2019-06-05 PROCEDURE — 80048 BASIC METABOLIC PNL TOTAL CA: CPT | Performed by: INTERNAL MEDICINE

## 2019-06-05 PROCEDURE — 82962 GLUCOSE BLOOD TEST: CPT

## 2019-06-05 PROCEDURE — 94640 AIRWAY INHALATION TREATMENT: CPT

## 2019-06-05 NOTE — PROGRESS NOTES
[facility]    Progress Note    Noni Canseco Patient Status:  Inpatient    1920 MRN [id number]   Location [facility] 3W/SW Attending Alcira Anderson MD   Hosp Day # 4 PCP Carter Rubio MD            Subjective:     Stat 252* 158* 185* 160*  --  180*   BUN 14 10 13 16  --  15   CREATSERUM 1.14* 0.94 0.96 1.02  --  1.00   GFRAA 46* 58* 57* 53*  --  54*   GFRNAA 40* 50* 49* 46*  --  47*   CA 9.0 7.7* 8.5 8.1*  --  8.6   ALB 3.4 2.4*  --   --   --   --     139 137 136

## 2019-06-05 NOTE — CARDIAC REHAB
CARDIAC REHAB HEART FAILURE EDUCATION    Handouts provided and reviewed: CHF Booklet. Activity: Chair for all meals: yes       Ambulation: n/a       Tolerated Activity: n/a         Disease Process: Disease process reviewed.     Reviewed the following: D

## 2019-06-05 NOTE — CM/SW NOTE
Case Management /Progression of Care  Informed by RN patient is declining , AUBRIE and would like to return to:  [facility]. [phone]  On discharge with therapy. Orders and Face to Face will be needed for Malu Kumar on discharge.    Lef

## 2019-06-05 NOTE — PROGRESS NOTES
[facility] Hospitalist Progress Note     CC: Hospital Follow up    PCP: Jorge Stout MD       Assessment/Plan:     Principal Problem:    Severe sepsis [facility])    Ms. Junior Crowell is a 80year old female with PMH sig for HTN, GERD, DM (no longer on meds) who pre discussion with PCP     #HTN  - hold amlodipine, if needed would switch to BB at time of discahrge     #DM  - not on oral meds  -  on admit, likely due to sepsis  - SSI and acucheks for now  - a1c 7.1, therefore does not need meds on DC given age    6. 8   .0 187.0 174.0         Recent Labs   Lab 06/03/19  0551 06/04/19  0606 06/04/19  1811 06/05/19  0652   * 160*  --  180*   BUN 13 16  --  15   CREATSERUM 0.96 1.02  --  1.00   GFRAA 57* 53*  --  54*   GFRNAA 49* 46*  --  47*   CA 8.5 8.1

## 2019-06-05 NOTE — PROGRESS NOTES
[facility] AND [facility] Infectious Disease  Progress Note    Albert Montano Patient Status:  Inpatient    1920 MRN [id number]   Location [facility] 3W/SW Attending Maggie Hall MD   Hosp Day # 4 PCP Rossy Dominguez MD     Subjective:  Yamil Sanabria left lateral interpolar hypodense renal lesion.  Suggest nonemergent follow-up renal ultrasound to determine if this is solid or cystic. 6. Approximate 1 cm additional cystic pancreatic body lesion.  Differential considerations include a pseudocyst (partic

## 2019-06-06 VITALS
HEART RATE: 80 BPM | RESPIRATION RATE: 18 BRPM | HEIGHT: 63 IN | TEMPERATURE: 98 F | WEIGHT: 151.19 LBS | DIASTOLIC BLOOD PRESSURE: 78 MMHG | OXYGEN SATURATION: 97 % | BODY MASS INDEX: 26.79 KG/M2 | SYSTOLIC BLOOD PRESSURE: 132 MMHG

## 2019-06-06 PROCEDURE — 80048 BASIC METABOLIC PNL TOTAL CA: CPT | Performed by: INTERNAL MEDICINE

## 2019-06-06 PROCEDURE — 82962 GLUCOSE BLOOD TEST: CPT

## 2019-06-06 PROCEDURE — 83735 ASSAY OF MAGNESIUM: CPT | Performed by: INTERNAL MEDICINE

## 2019-06-06 RX ORDER — CIPROFLOXACIN 500 MG/1
500 TABLET, FILM COATED ORAL DAILY
Qty: 10 TABLET | Refills: 0 | Status: SHIPPED | OUTPATIENT
Start: 2019-06-06 | End: 2019-06-16

## 2019-06-06 RX ORDER — POTASSIUM CHLORIDE 20 MEQ/1
40 TABLET, EXTENDED RELEASE ORAL EVERY 4 HOURS
Status: DISCONTINUED | OUTPATIENT
Start: 2019-06-06 | End: 2019-06-06

## 2019-06-06 NOTE — PROGRESS NOTES
Pulmonary Progress Note     Assessment / Plan:  1. Acute respiratory failure - due to pulm edema. Less likely aspiration pneumonia  - O2 prn, on RA now  - cont IV lasix through today  - zosyn  2. HFpEF - TTE with grade 2 DD and severe MR  - diuresis  3.  Se

## 2019-06-06 NOTE — CM/SW NOTE
Case Management/ Progression of Care    Informed by [facility] patient cannot return unless she goes to [facility] first.  Updated patient and son/POA ed who are agreeable to rehabilitation.    A referral has been sent to  Adriano Spivey

## 2019-06-06 NOTE — PROGRESS NOTES
Per Dr. Bettina Berumen, spoke with Dr. Sandor Montes and patient okay for discharge and will follow-up with Dr. Sandor Montes in 1 week

## 2019-06-06 NOTE — CM/SW NOTE
06/06/19 1400   Discharge disposition   Expected discharge disposition Skilled Nurs   Name of [address]  [place] )   Discharge transportation Other (comment)  ( [facility] ambulance)   MDO orders received for discharge.  P

## 2019-06-06 NOTE — DISCHARGE SUMMARY
[facility] Hospitalist Discharge Summary   Patient ID:  Vanessa Ronquillo [id number]  28 year old 2/28/1920    Admit date: 6/1/2019  Discharge date: 6/6/2019  Risk of Readmission Lace+ Score: 56  59-90 High Risk  29-58 Medium Risk  0-28   Low Risk    Primary Care y Courtney for subacute rehab and will continue 10 more days of oral cipro.      #Acute hypoxic resp failure   #HFpEF with acute exacerbation  #Severe MR/TR  - acutely hypoxic on 6/3 am, CXR read pending but appears consistent with pulm edema, up to 10L HF N Carbonate Antacid 500 MG Chew  Commonly known as:  TUMS     furosemide 20 MG Tabs  Commonly known as:  LASIX  TAKE 1 TABLET BY MOUTH DAILY     glimepiride 2 MG Tabs  Commonly known as:  AMARYL  TAKE 1 TABLET BY MOUTH DAILY     MAPAP 325 MG Tabs  Generic dr

## 2019-06-06 NOTE — PLAN OF CARE
Problem: Diabetes/Glucose Control  Goal: Glucose maintained within prescribed range  Description  INTERVENTIONS:  - Monitor Blood Glucose as ordered  - Assess for signs and symptoms of hyperglycemia and hypoglycemia  - Administer ordered medications to m from fall injury  Description  INTERVENTIONS:  - Assess pt frequently for physical needs  - Identify cognitive and physical deficits and behaviors that affect risk of falls.   - [place] fall precautions as indicated by assessment.  - Educate pt/family on

## 2019-06-06 NOTE — PROGRESS NOTES
[facility] AND [facility] Infectious Disease Progress Note    Catia Gonzalez Patient Status:  Inpatient    1920 MRN [id number]   Location [facility] 3W/SW Attending Harjinder Quintana MD   Hosp Day # 5 PCP Roberto Hankins MD     Subjective:  Pt rhythm. No murmur. Abdomen:  Soft, non-distended, non-tender, with no rebound or guarding. No peritoneal signs. No ascites. Liver is within normal limits. Spleen is not palpable. Extremities:  No lower extremity edema noted.   Without clubbing or cya

## 2019-06-06 NOTE — PROGRESS NOTES
Report given to Frederick Zavala RN at [facility]. Family at bedside.  Patient to transport to [facility] via ambulance at [facility]

## 2019-06-10 NOTE — PROGRESS NOTES
Percy Chavez Patient Status:  No patient class for patient encounter    1920 MRN [id number]   Location MD Dr Penny Paredes is a 80year old femal 2.4 (L) 06/02/2019 03:39 AM    ALKPHO 61 06/02/2019 03:39 AM    BILT 0.8 06/02/2019 03:39 AM    TP 5.6 (L) 06/02/2019 03:39 AM    AST 37 06/02/2019 03:39 AM    ALT 21 06/02/2019 03:39 AM    TSH 0.399 01/26/2018 02:41 PM    LIP 98 06/01/2019 10:56 AM    MG >150.      Plan:  Continue current medications     Continue tracking daily weights. Call with weight gain of 3 lbs overnight or concerning symptoms. [phone]    32-52 oz fluid restriction    Less than 2000 mg sodium/salt diet.  Common high sodium food

## 2019-06-11 ENCOUNTER — OFFICE VISIT (OUTPATIENT)
Dept: CARDIOLOGY CLINIC | Facility: HOSPITAL | Age: 84
End: 2019-06-11
Attending: CLINICAL NURSE SPECIALIST
Payer: MEDICARE

## 2019-06-11 VITALS
HEART RATE: 97 BPM | SYSTOLIC BLOOD PRESSURE: 164 MMHG | DIASTOLIC BLOOD PRESSURE: 86 MMHG | OXYGEN SATURATION: 99 % | WEIGHT: 150.5 LBS | BODY MASS INDEX: 27 KG/M2

## 2019-06-11 DIAGNOSIS — I50.32 CHRONIC HEART FAILURE WITH PRESERVED EJECTION FRACTION (HCC): Primary | ICD-10-CM

## 2019-06-11 PROBLEM — I07.1 TRICUSPID REGURGITATION: Status: ACTIVE | Noted: 2019-06-11

## 2019-06-11 PROBLEM — I34.0 MITRAL VALVE REGURGITATION: Status: ACTIVE | Noted: 2019-06-11

## 2019-06-11 PROCEDURE — 99212 OFFICE O/P EST SF 10 MIN: CPT | Performed by: CLINICAL NURSE SPECIALIST

## 2019-06-11 PROCEDURE — 99214 OFFICE O/P EST MOD 30 MIN: CPT | Performed by: CLINICAL NURSE SPECIALIST

## 2019-06-11 NOTE — PATIENT INSTRUCTIONS
Continue current medications     Continue tracking daily weights. Call with weight gain of 3 lbs overnight or concerning symptoms. [phone]    32-52 oz fluid restriction    Less than 2000 mg sodium/salt diet.  Common high sodium foods include frozen d

## 2019-11-04 ENCOUNTER — HOSPITAL ENCOUNTER (INPATIENT)
Facility: HOSPITAL | Age: 84
LOS: 3 days | Discharge: HOME | DRG: 872 | End: 2019-11-07
Attending: EMERGENCY MEDICINE | Admitting: HOSPITALIST
Payer: MEDICARE

## 2019-11-04 ENCOUNTER — APPOINTMENT (OUTPATIENT)
Dept: GENERAL RADIOLOGY | Facility: HOSPITAL | Age: 84
DRG: 872 | End: 2019-11-04
Attending: EMERGENCY MEDICINE
Payer: MEDICARE

## 2019-11-04 DIAGNOSIS — A41.9 SEPSIS DUE TO UNDETERMINED ORGANISM (HCC): Primary | ICD-10-CM

## 2019-11-04 PROBLEM — D72.829 LEUKOCYTOSIS: Status: ACTIVE | Noted: 2019-11-04

## 2019-11-04 PROCEDURE — 80048 BASIC METABOLIC PNL TOTAL CA: CPT | Performed by: EMERGENCY MEDICINE

## 2019-11-04 PROCEDURE — 87186 SC STD MICRODIL/AGAR DIL: CPT | Performed by: EMERGENCY MEDICINE

## 2019-11-04 PROCEDURE — 83036 HEMOGLOBIN GLYCOSYLATED A1C: CPT | Performed by: HOSPITALIST

## 2019-11-04 PROCEDURE — 83605 ASSAY OF LACTIC ACID: CPT | Performed by: EMERGENCY MEDICINE

## 2019-11-04 PROCEDURE — 85025 COMPLETE CBC W/AUTO DIFF WBC: CPT

## 2019-11-04 PROCEDURE — 36415 COLL VENOUS BLD VENIPUNCTURE: CPT

## 2019-11-04 PROCEDURE — 96361 HYDRATE IV INFUSION ADD-ON: CPT

## 2019-11-04 PROCEDURE — 83605 ASSAY OF LACTIC ACID: CPT | Performed by: HOSPITALIST

## 2019-11-04 PROCEDURE — 93005 ELECTROCARDIOGRAM TRACING: CPT

## 2019-11-04 PROCEDURE — 85730 THROMBOPLASTIN TIME PARTIAL: CPT | Performed by: EMERGENCY MEDICINE

## 2019-11-04 PROCEDURE — 87077 CULTURE AEROBIC IDENTIFY: CPT | Performed by: EMERGENCY MEDICINE

## 2019-11-04 PROCEDURE — 81001 URINALYSIS AUTO W/SCOPE: CPT | Performed by: EMERGENCY MEDICINE

## 2019-11-04 PROCEDURE — 87086 URINE CULTURE/COLONY COUNT: CPT | Performed by: EMERGENCY MEDICINE

## 2019-11-04 PROCEDURE — 85025 COMPLETE CBC W/AUTO DIFF WBC: CPT | Performed by: EMERGENCY MEDICINE

## 2019-11-04 PROCEDURE — 99291 CRITICAL CARE FIRST HOUR: CPT

## 2019-11-04 PROCEDURE — 85610 PROTHROMBIN TIME: CPT | Performed by: EMERGENCY MEDICINE

## 2019-11-04 PROCEDURE — 96365 THER/PROPH/DIAG IV INF INIT: CPT

## 2019-11-04 PROCEDURE — 71045 X-RAY EXAM CHEST 1 VIEW: CPT | Performed by: EMERGENCY MEDICINE

## 2019-11-04 PROCEDURE — 93010 ELECTROCARDIOGRAM REPORT: CPT | Performed by: EMERGENCY MEDICINE

## 2019-11-04 PROCEDURE — 80048 BASIC METABOLIC PNL TOTAL CA: CPT

## 2019-11-04 PROCEDURE — 87040 BLOOD CULTURE FOR BACTERIA: CPT | Performed by: EMERGENCY MEDICINE

## 2019-11-04 PROCEDURE — 82962 GLUCOSE BLOOD TEST: CPT

## 2019-11-04 RX ORDER — SODIUM CHLORIDE 9 MG/ML
INJECTION, SOLUTION INTRAVENOUS CONTINUOUS
Status: ACTIVE | OUTPATIENT
Start: 2019-11-04 | End: 2019-11-04

## 2019-11-04 RX ORDER — BISACODYL 10 MG
10 SUPPOSITORY, RECTAL RECTAL
Status: DISCONTINUED | OUTPATIENT
Start: 2019-11-04 | End: 2019-11-07

## 2019-11-04 RX ORDER — METOCLOPRAMIDE HYDROCHLORIDE 5 MG/ML
5 INJECTION INTRAMUSCULAR; INTRAVENOUS EVERY 8 HOURS PRN
Status: DISCONTINUED | OUTPATIENT
Start: 2019-11-04 | End: 2019-11-07

## 2019-11-04 RX ORDER — DOCUSATE SODIUM 100 MG/1
100 CAPSULE, LIQUID FILLED ORAL 2 TIMES DAILY
Status: DISCONTINUED | OUTPATIENT
Start: 2019-11-04 | End: 2019-11-07

## 2019-11-04 RX ORDER — POTASSIUM CHLORIDE 20 MEQ/1
40 TABLET, EXTENDED RELEASE ORAL EVERY 4 HOURS
Status: COMPLETED | OUTPATIENT
Start: 2019-11-04 | End: 2019-11-05

## 2019-11-04 RX ORDER — HEPARIN SODIUM 5000 [USP'U]/ML
5000 INJECTION, SOLUTION INTRAVENOUS; SUBCUTANEOUS EVERY 12 HOURS SCHEDULED
Status: DISCONTINUED | OUTPATIENT
Start: 2019-11-04 | End: 2019-11-07

## 2019-11-04 RX ORDER — ONDANSETRON 2 MG/ML
4 INJECTION INTRAMUSCULAR; INTRAVENOUS EVERY 6 HOURS PRN
Status: DISCONTINUED | OUTPATIENT
Start: 2019-11-04 | End: 2019-11-07

## 2019-11-04 RX ORDER — DEXTROSE MONOHYDRATE 25 G/50ML
50 INJECTION, SOLUTION INTRAVENOUS AS NEEDED
Status: DISCONTINUED | OUTPATIENT
Start: 2019-11-04 | End: 2019-11-07

## 2019-11-04 RX ORDER — ACETAMINOPHEN 500 MG
1000 TABLET ORAL ONCE
Status: COMPLETED | OUTPATIENT
Start: 2019-11-04 | End: 2019-11-04

## 2019-11-04 RX ORDER — POLYETHYLENE GLYCOL 3350 17 G/17G
17 POWDER, FOR SOLUTION ORAL DAILY PRN
Status: DISCONTINUED | OUTPATIENT
Start: 2019-11-04 | End: 2019-11-07

## 2019-11-04 RX ORDER — ACETAMINOPHEN 325 MG/1
650 TABLET ORAL EVERY 6 HOURS PRN
Status: DISCONTINUED | OUTPATIENT
Start: 2019-11-04 | End: 2019-11-07

## 2019-11-04 RX ORDER — SODIUM CHLORIDE 0.9 % (FLUSH) 0.9 %
3 SYRINGE (ML) INJECTION AS NEEDED
Status: DISCONTINUED | OUTPATIENT
Start: 2019-11-04 | End: 2019-11-07

## 2019-11-04 RX ORDER — FAMOTIDINE 20 MG/1
20 TABLET ORAL DAILY
Status: DISCONTINUED | OUTPATIENT
Start: 2019-11-04 | End: 2019-11-07

## 2019-11-04 NOTE — ED NOTES
Patient from [facility] 55 c/o tremors that stated today at 2pm, alert & oriented X3 denies chest pain or SOB, will continue to monitor.

## 2019-11-04 NOTE — ED PROVIDER NOTES
Patient Seen in: [facility] AND [facility] Emergency Department      History   Patient presents with:  Fever (infectious)    Stated Complaint: fever    HPI    The patient is a 59-year-old female who presents with fever and shaking chills since this afternoon.   Serge Varela Pupils: Pupils are equal, round, and reactive to light. Neck:      Musculoskeletal: Normal range of motion and neck supple. Vascular: No JVD. Cardiovascular:      Rate and Rhythm: Regular rhythm. Tachycardia present.       Heart sounds: Normal hear components:    Lactic Acid 2.9 (*)     All other components within normal limits   HEMOGLOBIN A1C - Abnormal; Notable for the following components:    HgbA1C 6.8 (*)     Estimated Average Glucose 148 (*)     All other components within normal limits   POCT improved. Case discussed with [facility] medical hospitalist and patient will be admitted for IV antibiotics.       [facility]     Pulse Ox: 96%, Normal, room air    Cardiac Monitor: Pulse Readings from Last 1 Encounters:  11/05/19 : 80  , sinus, tachycardia        A

## 2019-11-05 PROCEDURE — 80053 COMPREHEN METABOLIC PANEL: CPT | Performed by: HOSPITALIST

## 2019-11-05 PROCEDURE — 84132 ASSAY OF SERUM POTASSIUM: CPT | Performed by: HOSPITALIST

## 2019-11-05 PROCEDURE — 83605 ASSAY OF LACTIC ACID: CPT | Performed by: HOSPITALIST

## 2019-11-05 PROCEDURE — 84145 PROCALCITONIN (PCT): CPT | Performed by: NURSE PRACTITIONER

## 2019-11-05 PROCEDURE — 82962 GLUCOSE BLOOD TEST: CPT

## 2019-11-05 PROCEDURE — 85027 COMPLETE CBC AUTOMATED: CPT | Performed by: HOSPITALIST

## 2019-11-05 RX ORDER — AMLODIPINE BESYLATE 5 MG/1
5 TABLET ORAL DAILY
COMMUNITY

## 2019-11-05 RX ORDER — MAGNESIUM OXIDE 400 MG (241.3 MG MAGNESIUM) TABLET
3 TABLET NIGHTLY
Status: DISCONTINUED | OUTPATIENT
Start: 2019-11-05 | End: 2019-11-07

## 2019-11-05 RX ORDER — CALCIUM CARBONATE 200(500)MG
500 TABLET,CHEWABLE ORAL 3 TIMES DAILY PRN
Status: DISCONTINUED | OUTPATIENT
Start: 2019-11-05 | End: 2019-11-07

## 2019-11-05 RX ORDER — MELATONIN
3 NIGHTLY
COMMUNITY
End: 2019-12-23

## 2019-11-05 RX ORDER — SODIUM CHLORIDE 9 MG/ML
INJECTION, SOLUTION INTRAVENOUS CONTINUOUS
Status: DISCONTINUED | OUTPATIENT
Start: 2019-11-05 | End: 2019-11-05

## 2019-11-05 RX ORDER — SENNA AND DOCUSATE SODIUM 50; 8.6 MG/1; MG/1
1 TABLET, FILM COATED ORAL NIGHTLY
Status: DISCONTINUED | OUTPATIENT
Start: 2019-11-05 | End: 2019-11-07

## 2019-11-05 RX ORDER — GUAIFENESIN/DEXTROMETHORPHAN 100-10MG/5
5 SYRUP ORAL 4 TIMES DAILY PRN
COMMUNITY

## 2019-11-05 RX ORDER — AMLODIPINE BESYLATE 5 MG/1
5 TABLET ORAL DAILY
Status: DISCONTINUED | OUTPATIENT
Start: 2019-11-05 | End: 2019-11-07

## 2019-11-05 RX ORDER — CALCIUM CARBONATE 200(500)MG
1000 TABLET,CHEWABLE ORAL EVERY 6 HOURS PRN
Status: DISCONTINUED | OUTPATIENT
Start: 2019-11-05 | End: 2019-11-07

## 2019-11-05 NOTE — OCCUPATIONAL THERAPY NOTE
OT orders received and chart reviewed. RN approving of OT session. Pt received in bed, alert and oriented. Pt refusing all attempts to engage in oob activities stating that she wanted to wait for her wc to come before she got up.  Eval deferred per pt

## 2019-11-05 NOTE — CM/SW NOTE
11/05/19 1400   CM/SW Screening   Referral [address] staff; Chart review;Nursing rounds   Patient's [address] Name [facility]   Patient Status Prior to Admission   Independent with

## 2019-11-05 NOTE — ED NOTES
Orders for admission, patient is aware of plan and ready to go upstairs.  Any questions, please call ED RN Jackson Herrera  at extension [phone]    Patient is bed bound- usees a wheelchair, a/ox3, sepsis infusion complete, received antibiotics, needs repeat lactic

## 2019-11-05 NOTE — PLAN OF CARE
Problem: GASTROINTESTINAL - ADULT  Goal: Minimal or absence of nausea and vomiting  Description  INTERVENTIONS:  - Maintain adequate hydration with IV or PO as ordered and tolerated  - Nasogastric tube to low intermittent suction as ordered  - Evaluate e INFECTION - ADULT  Goal: Absence of fever/infection during anticipated neutropenic period  Description  INTERVENTIONS  - Monitor WBC  - Administer growth factors as ordered  - Implement neutropenic guidelines  Outcome: Progressing     Problem: SAFETY ADULT

## 2019-11-05 NOTE — PROGRESS NOTES
[facility] Pharmacy Note:  Renal Dose Adjustment for Metoclopramide (REGLAN)    Munira Vaughan has been prescribed Metoclopramide (REGLAN) 10 mg every 8 hours as needed for n/v.    Estimated Creatinine Clearance: 26.7 mL/min (based on SCr of 0.95 mg/dL).     Her ca

## 2019-11-05 NOTE — SEPSIS REASSESSMENT
[facility]    Sepsis Reassessment Note    BP (!) 161/75   Pulse 107   Temp 99.2 °F (37.3 °C) (Oral)   Resp 15   Ht 160 cm (5' 3\")   Wt 64.4 kg   SpO2 96%   BMI 25.15 kg/m²      6:56 PM    Cardiac:  Regularity: Regular  Rate: Tachycardic

## 2019-11-05 NOTE — PLAN OF CARE
Problem: GASTROINTESTINAL - ADULT  Goal: Minimal or absence of nausea and vomiting  Description  INTERVENTIONS:  - Maintain adequate hydration with IV or PO as ordered and tolerated  - Nasogastric tube to low intermittent suction as ordered  - Evaluate e electrolyte imbalances  - Administer electrolyte replacement as ordered  - Monitor response to electrolyte replacements, including rhythm and repeat lab results as appropriate  - Fluid restriction as ordered  - Instruct patient on fluid and nutrition restr

## 2019-11-05 NOTE — PAYOR COMM NOTE
--------------  ADMISSION REVIEW     Payor: [facility] [place] #:  [id number]  Authorization Number: N/A    ED Provider Notes      Patient Seen in: [facility] Emergency Department      History   Patient presents with:  Fever (infe Pupils are equal, round, and reactive to light. Neck:      Musculoskeletal: Normal range of motion and neck supple. Vascular: No JVD. Cardiovascular:      Rate and Rhythm: Regular rhythm. Tachycardia present.       Heart sounds: Normal heart sounds Lactic Acid 2.9 (*)     All other components within normal limits   HEMOGLOBIN A1C - Abnormal; Notable for the following components:    HgbA1C 6.8 (*)     Estimated Average Glucose 148 (*)     All other components within normal limits   POCT GLUCOSE - Abno discussed with [facility] hospitalist and patient will be admitted for IV antibiotics.       Disposition and Plan     Clinical Impression:  Sepsis due to undetermined organism ([facility] [address]  (primary encounter diagnosis)              H&P Note      ASSESSMENT / RN, NP  [address] [facility] Team  Pager [phone]  Answering Service number: [phone]  11/5/2019        HISTORY:   CC: Patient presents with:  Fever (infectious)        PCP: Amy Mcclelland MD-now sees Dr Nancy Reynolds of [facility] CREATSERUM 0.95 0.81  --    * 129*  --    CA 8.8 8.6  --              Recent Labs   Lab 11/05/19  0503   ALT 21   AST 33   ALB 2.8*         No results for input(s): TROP in the last 168 hours.     Radiology: Xr Chest Ap Portable  (cpt=71045)     R PCT 1.78  -UA with small LE, few bacteria   -blood cx  -CXR with mild opacification left lung base  -no open wounds   -S/P IVF, now stopped with hx diastolic CHF and valvular issues  -zosyn     Hyponatremia-Resolved  -Na 133-->136  -S/P IVF     HTN  -SBP e result   Specimen Information: Urine, clean catch        URINE CULTURE >100,000 CFU/ML Klebsiella (Enterobacter) aerogenesAbnormal     Note: This organism is known to possess inducible b-lactamases.  Isolates that are initially susceptible may become resist

## 2019-11-05 NOTE — H&P
[facility] Hospitalist Team  History and Physical  Admit Date:  11/4/19    ASSESSMENT / PLAN:   Ms. Kenn Alpers is a 80 year old female with PMH sig for HTN, GERD, DM (no longer on meds), diastolic chf, previous back abscess(which of note did not grow pseudomonas) and PCP: Surya Grajeda MD-now sees Dr Pieter Adams    History of Present Illness:  Ms. Rex Zuñiga is B 22 year old female with PMH sig for HTN, GERD, DM (no longer on meds), diastolic chf, previous back abscess(which of note did not grow pseudomonas) and pseudo DIRECTED, Disp: 28.4 g, Rfl: 0  GLIMEPIRIDE 2 MG Oral Tab, TAKE 1 TABLET BY MOUTH DAILY, Disp: 90 tablet, Rfl: 3  MAPAP 325 MG Oral Tab, TAKE 2 TABLETS (650mg) BY MOUTH EVERY 6 HOURS AS NEEDED, Disp: 90 tablet, Rfl: 1  RANITIDINE  MG Oral Tab, TAKE with APN and agree with note above. HPI   Ms. Pauly Pierce is L 58 year old female with PMH sig for HTN, GERD, DM (no longer on meds), diastolic chf, previous back abscess(which of note did not grow pseudomonas) and pseudomonas bacteremia, severe MR/TR who pr PCP    GERD  -PPI  -prn tums      Rest as above with above

## 2019-11-06 PROCEDURE — 85025 COMPLETE CBC W/AUTO DIFF WBC: CPT | Performed by: NURSE PRACTITIONER

## 2019-11-06 PROCEDURE — 97162 PT EVAL MOD COMPLEX 30 MIN: CPT

## 2019-11-06 PROCEDURE — 97165 OT EVAL LOW COMPLEX 30 MIN: CPT

## 2019-11-06 PROCEDURE — 82962 GLUCOSE BLOOD TEST: CPT

## 2019-11-06 PROCEDURE — 80048 BASIC METABOLIC PNL TOTAL CA: CPT | Performed by: NURSE PRACTITIONER

## 2019-11-06 PROCEDURE — 97530 THERAPEUTIC ACTIVITIES: CPT

## 2019-11-06 PROCEDURE — 97535 SELF CARE MNGMENT TRAINING: CPT

## 2019-11-06 RX ORDER — FUROSEMIDE 20 MG/1
20 TABLET ORAL DAILY
Status: DISCONTINUED | OUTPATIENT
Start: 2019-11-07 | End: 2019-11-07

## 2019-11-06 NOTE — CM/SW NOTE
MDO for HHC. Cm met with pt re dc planning for HHC. PT currently lives at [address]. Pt aware of  PT/OT recs for [facility] [address]. Pt declined HHC-PT. \"At my age I don't need it\".  Pt feels safe with asst with needs from AL staff    / to rem

## 2019-11-06 NOTE — OCCUPATIONAL THERAPY NOTE
OCCUPATIONAL THERAPY EVALUATION - INPATIENT     Room Number: 507/898-Q  Evaluation Date: 11/6/2019  Type of Evaluation: Initial       Physician Order: IP Consult to Occupational Therapy  Reason for Therapy: ADL/IADL Dysfunction and Discharge Planning    OC activities; ADL training;Functional transfer training;Patient/Family education;Patient/Family training       OCCUPATIONAL THERAPY MEDICAL/SOCIAL HISTORY     Problem List  Principal Problem:    Sepsis due to undetermined organism [facility]  Active Problems:    L much help from another person does the patient currently need…  -   Putting on and taking off regular lower body clothing?: A Little  -   Bathing (including washing, rinsing, drying)?: A Little  -   Toileting, which includes using toilet, bedpan or urinal?

## 2019-11-06 NOTE — PHYSICAL THERAPY NOTE
PHYSICAL THERAPY EVALUATION - INPATIENT     Room Number: 149/251-M  Evaluation Date: 11/6/2019  Type of Evaluation: Initial   Physician Order: PT Eval and Treat    Presenting Problem: Sepsis(Sepsis and UTI)  Reason for Therapy: Mobility Dysfunction and Zainab Ruiz back to [facility] with 24 hour supervision from nursing staff and in house skilled PT so pt can maximize her functional potential.    DISCHARGE RECOMMENDATIONS  PT Discharge Recommendations: Home with home health PT; Intermittent Supervision([facility] with HHPT)    CASSANDRA 0  Location: denies pain and seemed comfortable  Management Techniques: Activity promotion; Body mechanics;Breathing techniques;Repositioning    COGNITION  · Overall Cognitive Status:  WFL - within functional limits    RANGE OF MOTION AND STRENGTH ASSESSMEN self-stated goal is: to go back to her [facility]   Goal #1 Patient is able to demonstrate supine - sit EOB @ level: modified independent     Goal #1   Current Status    Goal #2 Patient is able to demonstrate transfers EOB to/from Chair/Wheelchair at assistance l

## 2019-11-06 NOTE — PROGRESS NOTES
[facility] Hospitalist Team  Progress Note    Carri  Patient Status:  Inpatient    1920 MRN [id number]   Location [facility] 5SW/SE Attending Tammy Ibarra MD   Hosp Day # 2 PCP Estrellita Kayser, MD     CC: Follow Up  PCP: Estrellita Kayser, Crisoforo Bi [phone]  Answering Service number: [phone]  11/6/2019    SUBJECTIVE:   Sitting in chair. Tells me she is cold from air blowing from window.  Tells me she has occasional chills      OBJECTIVE:   Blood pressure 147/54, pulse 86, temperature 97.9 °F (DEX4) oral liquid 15 g, 15 g, Oral, Q15 Min PRN  Normal Saline Flush 0.9 % injection 3 mL, 3 mL, Intravenous, PRN  Heparin Sodium (Porcine) 5000 UNIT/ML injection 5,000 Units, 5,000 Units, Subcutaneous, 2 times per day  acetaminophen (TYLENOL) tab 650 mg, clubbing, no cyanosis   Skin: no rashes or lesions, well perfused  Psych: mood stable, cooperative  Neuro: no focal deficits    Assessment and Plan  Sepsis 2/2 Klebsiella UTI  -Tmax 99.2 WBC 15.4-->10.7-->7.3. LA 2.2-->2.9-->2.1-->1.7.  PCT 1.78  -UA with s

## 2019-11-07 VITALS
SYSTOLIC BLOOD PRESSURE: 132 MMHG | WEIGHT: 146.63 LBS | HEIGHT: 63 IN | DIASTOLIC BLOOD PRESSURE: 61 MMHG | OXYGEN SATURATION: 95 % | BODY MASS INDEX: 25.98 KG/M2 | HEART RATE: 93 BPM | RESPIRATION RATE: 18 BRPM | TEMPERATURE: 98 F

## 2019-11-07 PROBLEM — D72.829 LEUKOCYTOSIS: Status: RESOLVED | Noted: 2019-11-04 | Resolved: 2019-11-07

## 2019-11-07 PROCEDURE — 80048 BASIC METABOLIC PNL TOTAL CA: CPT | Performed by: NURSE PRACTITIONER

## 2019-11-07 PROCEDURE — 85025 COMPLETE CBC W/AUTO DIFF WBC: CPT | Performed by: NURSE PRACTITIONER

## 2019-11-07 PROCEDURE — 82962 GLUCOSE BLOOD TEST: CPT

## 2019-11-07 RX ORDER — LEVOFLOXACIN 750 MG/1
750 TABLET ORAL
Qty: 2 TABLET | Refills: 0 | Status: SHIPPED | OUTPATIENT
Start: 2019-11-08 | End: 2019-11-14

## 2019-11-07 RX ORDER — LEVOFLOXACIN 750 MG/1
750 TABLET ORAL
Status: DISCONTINUED | OUTPATIENT
Start: 2019-11-08 | End: 2019-11-07

## 2019-11-07 NOTE — PLAN OF CARE
Problem: GASTROINTESTINAL - ADULT  Goal: Minimal or absence of nausea and vomiting  Description  INTERVENTIONS:  - Maintain adequate hydration with IV or PO as ordered and tolerated  - Nasogastric tube to low intermittent suction as ordered  - Evaluate e Adequate for Discharge     Problem: RISK FOR INFECTION - ADULT  Goal: Absence of fever/infection during anticipated neutropenic period  Description  INTERVENTIONS  - Monitor WBC  - Administer growth factors as ordered  - Implement neutropenic guidelines  O choices  Outcome: Adequate for Discharge

## 2019-11-07 NOTE — PROGRESS NOTES
Patient dc home. IV removed. Understands to follow up with PCP in 1 week. Patient understands that she will take a levaquin tomorrow am and then Sunday and completed her course. PT sons were at bedside and teaching completed with them as well.  All needs me

## 2019-11-07 NOTE — PROGRESS NOTES
Pt requesting a medicar for dc back to [facility]. PCS form in the chart. Will be here at 1230 pm. Patient aware and agreeable for the fee of 36.00.        [facility]: [phone]

## 2019-11-07 NOTE — PLAN OF CARE
Problem: GASTROINTESTINAL - ADULT  Goal: Minimal or absence of nausea and vomiting  Description  INTERVENTIONS:  - Maintain adequate hydration with IV or PO as ordered and tolerated  - Nasogastric tube to low intermittent suction as ordered  - Evaluate e INFECTION - ADULT  Goal: Absence of fever/infection during anticipated neutropenic period  Description  INTERVENTIONS  - Monitor WBC  - Administer growth factors as ordered  - Implement neutropenic guidelines  Outcome: Progressing     Problem: SAFETY ADULT care for you?   - Provide timely, complete, and accurate information to patient/family  - Incorporate patient and family knowledge, values, beliefs, and cultural backgrounds into the planning and delivery of care  - Encourage patient/family to participate

## 2019-11-07 NOTE — DISCHARGE SUMMARY
[facility] Hospitalist Discharge Summary   Patient ID:  Alejandro Grande  [id number]  85 year old  2/28/1920    Admit date: 11/4/2019  Discharge date: 11/7/2019    Primary Care Physician: Nikky Lopez MD   Attending Physician: Sushant Portillo MD   Consults:    Sepsis 2/2 Klebsiella UTI  -Tmax 99.2 WBC 15.4-->10.7-->7.3-->7.5. LA 2.2-->2.9-->2.1-->1.7.  PCT 1.78  -UA with small LE, few bacteria, urine cx with klebsiella UTI  -blood cx NGTD  -CXR with mild opacification left lung base  -zosyn stopped and trans List      CONTINUE taking these medications    amLODIPine Besylate 5 MG Tabs  Commonly known as:  NORVASC     bacitracin zinc-neomycin sulfate-polymyxin B sulfate 3.5-400-5000 Oint  APPLY TO AFFECTED AREA AS DIRECTED     Calcium Carbonate Antacid 500 MG Ch

## 2019-11-07 NOTE — PROGRESS NOTES
[facility] Pharmacy Note:  Renal Adjustment for levofloxacin [facility])    Carisa Brown is a 80year old female who has been prescribed levofloxacin (LEVAQUIN) 500 mg every 24 hrs.   CrCl is estimated creatinine clearance is 29.5 mL/min (based on SCr of 0.86 mg/d

## 2019-11-08 NOTE — PAYOR COMM NOTE
--------------  DISCHARGE REVIEW    Payor: [facility] [employer] [place] #:  [id number]  Authorization Number: [id number]    Admit date: 11/4/19  Admit time:  2047  Discharge Date: 11/7/2019 12:45 PM     Admitting Physician: Edith Singleton MD  Attendin

## 2019-11-14 ENCOUNTER — HOSPITAL ENCOUNTER (INPATIENT)
Facility: HOSPITAL | Age: 84
LOS: 3 days | Discharge: HOME | DRG: 872 | End: 2019-11-17
Attending: EMERGENCY MEDICINE | Admitting: HOSPITALIST
Payer: MEDICARE

## 2019-11-14 ENCOUNTER — APPOINTMENT (OUTPATIENT)
Dept: CV DIAGNOSTICS | Facility: HOSPITAL | Age: 84
DRG: 872 | End: 2019-11-14
Attending: INTERNAL MEDICINE
Payer: MEDICARE

## 2019-11-14 DIAGNOSIS — R78.81 BACTEREMIA: Primary | ICD-10-CM

## 2019-11-14 PROCEDURE — 99285 EMERGENCY DEPT VISIT HI MDM: CPT

## 2019-11-14 PROCEDURE — 93306 TTE W/DOPPLER COMPLETE: CPT | Performed by: INTERNAL MEDICINE

## 2019-11-14 PROCEDURE — 82962 GLUCOSE BLOOD TEST: CPT

## 2019-11-14 PROCEDURE — 36415 COLL VENOUS BLD VENIPUNCTURE: CPT

## 2019-11-14 PROCEDURE — 81003 URINALYSIS AUTO W/O SCOPE: CPT | Performed by: EMERGENCY MEDICINE

## 2019-11-14 PROCEDURE — 85025 COMPLETE CBC W/AUTO DIFF WBC: CPT | Performed by: EMERGENCY MEDICINE

## 2019-11-14 PROCEDURE — 80048 BASIC METABOLIC PNL TOTAL CA: CPT | Performed by: EMERGENCY MEDICINE

## 2019-11-14 PROCEDURE — 83735 ASSAY OF MAGNESIUM: CPT | Performed by: NURSE PRACTITIONER

## 2019-11-14 PROCEDURE — 87040 BLOOD CULTURE FOR BACTERIA: CPT | Performed by: EMERGENCY MEDICINE

## 2019-11-14 PROCEDURE — 84145 PROCALCITONIN (PCT): CPT | Performed by: INTERNAL MEDICINE

## 2019-11-14 RX ORDER — AMLODIPINE BESYLATE 5 MG/1
5 TABLET ORAL DAILY
Status: DISCONTINUED | OUTPATIENT
Start: 2019-11-14 | End: 2019-11-17

## 2019-11-14 RX ORDER — DOCUSATE SODIUM 100 MG/1
100 CAPSULE, LIQUID FILLED ORAL 2 TIMES DAILY
Status: DISCONTINUED | OUTPATIENT
Start: 2019-11-14 | End: 2019-11-16

## 2019-11-14 RX ORDER — POTASSIUM CHLORIDE 20 MEQ/1
40 TABLET, EXTENDED RELEASE ORAL EVERY 4 HOURS
Status: COMPLETED | OUTPATIENT
Start: 2019-11-14 | End: 2019-11-14

## 2019-11-14 RX ORDER — SENNA AND DOCUSATE SODIUM 50; 8.6 MG/1; MG/1
1 TABLET, FILM COATED ORAL NIGHTLY PRN
Status: DISCONTINUED | OUTPATIENT
Start: 2019-11-14 | End: 2019-11-17

## 2019-11-14 RX ORDER — FAMOTIDINE 20 MG/1
20 TABLET ORAL DAILY
Status: DISCONTINUED | OUTPATIENT
Start: 2019-11-15 | End: 2019-11-17

## 2019-11-14 RX ORDER — BISACODYL 10 MG
10 SUPPOSITORY, RECTAL RECTAL
Status: DISCONTINUED | OUTPATIENT
Start: 2019-11-14 | End: 2019-11-17

## 2019-11-14 RX ORDER — DEXTROSE MONOHYDRATE 25 G/50ML
50 INJECTION, SOLUTION INTRAVENOUS AS NEEDED
Status: DISCONTINUED | OUTPATIENT
Start: 2019-11-14 | End: 2019-11-17

## 2019-11-14 RX ORDER — CALCIUM CARBONATE 200(500)MG
1000 TABLET,CHEWABLE ORAL EVERY 6 HOURS PRN
Status: DISCONTINUED | OUTPATIENT
Start: 2019-11-14 | End: 2019-11-17

## 2019-11-14 RX ORDER — HEPARIN SODIUM 5000 [USP'U]/ML
5000 INJECTION, SOLUTION INTRAVENOUS; SUBCUTANEOUS EVERY 8 HOURS SCHEDULED
Status: DISCONTINUED | OUTPATIENT
Start: 2019-11-14 | End: 2019-11-17

## 2019-11-14 RX ORDER — ONDANSETRON 4 MG/1
4 TABLET, FILM COATED ORAL EVERY 6 HOURS PRN
Status: DISCONTINUED | OUTPATIENT
Start: 2019-11-14 | End: 2019-11-17

## 2019-11-14 RX ORDER — MAGNESIUM OXIDE 400 MG (241.3 MG MAGNESIUM) TABLET
3 TABLET NIGHTLY
Status: DISCONTINUED | OUTPATIENT
Start: 2019-11-14 | End: 2019-11-17

## 2019-11-14 RX ORDER — SODIUM CHLORIDE 0.9 % (FLUSH) 0.9 %
3 SYRINGE (ML) INJECTION AS NEEDED
Status: DISCONTINUED | OUTPATIENT
Start: 2019-11-14 | End: 2019-11-17

## 2019-11-14 RX ORDER — POLYETHYLENE GLYCOL 3350 17 G/17G
17 POWDER, FOR SOLUTION ORAL DAILY PRN
Status: DISCONTINUED | OUTPATIENT
Start: 2019-11-14 | End: 2019-11-17

## 2019-11-14 RX ORDER — FUROSEMIDE 20 MG/1
20 TABLET ORAL DAILY
Status: DISCONTINUED | OUTPATIENT
Start: 2019-11-15 | End: 2019-11-16

## 2019-11-14 RX ORDER — ACETAMINOPHEN 325 MG/1
650 TABLET ORAL EVERY 6 HOURS PRN
Status: DISCONTINUED | OUTPATIENT
Start: 2019-11-14 | End: 2019-11-17

## 2019-11-14 NOTE — H&P
[facility] Hospitalist Team  History and Physical  Admit Date:  11/14/19    ASSESSMENT / PLAN:   Ms. Fabiola Briceno is X 8 year old female from  [address] sig for HTN, GERD, DM (no longer on meds), diastolic chf, previous back abscess(which of note did not grow pseudomonas) a Team  Pager [phone]  Answering Service number: [phone]  11/14/2019      HISTORY:   CC: Patient presents with:  Abnormal Labs       PCP: Andrés Hoskins MD    History of Present Illness:    Ms. Humberto Guerra is Y 53 year old female [facility]  [facility]       Soc Hx  Social History    Tobacco Use      Smoking status: Never Smoker      Smokeless tobacco: Never Used    Alcohol use: No       Fam Hx  Family History   Family history unknown: Yes       Review of Systems  A comprehensive 10 recommend vancomycin    Hypokalemia  -replete via protocol     HTN  -continue norvasc      DM-Diet Controlled   -A1C 6.8  -home meds: glimepiride  -hold oral meds, SSI prn  -Accuchecks  -ADA diet      HFpEF   Severe MR/TR  -6/2019  TTE with EF 55%, grade 2

## 2019-11-14 NOTE — ED PROVIDER NOTES
Patient Seen in: [facility] 5sw/se      History   Patient presents with:  Abnormal Labs    Stated Complaint: abnl labs    HPI    80-year-old female presents for evaluation of abnormal labs. Patient denies complaint, reports she is feeling well. Pulmonary:      Effort: Pulmonary effort is normal. No respiratory distress. Breath sounds: Normal breath sounds. Abdominal:      General: Bowel sounds are normal. There is no distension. Palpations: Abdomen is soft. Tenderness:  There is Patient was noted to have blood cultures with delayed growth of Eggerthella (Eubacterium) lentum and infectious disease recommended admission. Discussed with Dr. Gilbert Zuleta who recommends starting patient on vancomycin.   Admitted for further evaluation and man

## 2019-11-14 NOTE — CM/SW NOTE
11/14/19 1500   Readmission Assessment   Factors that patient feels contributed to this readmission Other (only choose if nothing else applies)  (chills)   Pt. received education on diagnoses at time of discharge?  Yes   Did you know who and how to call

## 2019-11-14 NOTE — ED INITIAL ASSESSMENT (HPI)
Pt BIBEMS from [facility] d/t abnormal labs.  Per EMS unsure of which lab is abnormal. Denies cp, sob, n/v/d/f/c.   AOx3, RR even/nonlabored

## 2019-11-14 NOTE — ED NOTES
Orders for admission, patient is aware of plan and ready to go upstairs.  Any questions, please call ED RN Joy Orozco  at extension [phone]

## 2019-11-15 ENCOUNTER — APPOINTMENT (OUTPATIENT)
Dept: NUCLEAR MEDICINE | Facility: HOSPITAL | Age: 84
DRG: 872 | End: 2019-11-15
Attending: INTERNAL MEDICINE
Payer: MEDICARE

## 2019-11-15 PROCEDURE — 85025 COMPLETE CBC W/AUTO DIFF WBC: CPT | Performed by: NURSE PRACTITIONER

## 2019-11-15 PROCEDURE — 80048 BASIC METABOLIC PNL TOTAL CA: CPT | Performed by: NURSE PRACTITIONER

## 2019-11-15 PROCEDURE — 82962 GLUCOSE BLOOD TEST: CPT

## 2019-11-15 PROCEDURE — 78806 NM INFECTION INDIUM WBC 111 COMPLETE  (CPT=78806): CPT | Performed by: INTERNAL MEDICINE

## 2019-11-15 NOTE — PROGRESS NOTES
[facility] AND [facility] Infectious Disease  Progress Note    Pawel Sanchez Patient Status:  Inpatient    1920 MRN [id number]   Location [facility] 5SW/SE Attending Jean Paul Locke MD   Hosp Day # 1 PCP Andreina Ty MD     Subjective:  symptoms    3. Disposition - inpatient. NM scan is pending. Continue IV zosyn for now and will follow with further recommendations. Final choice, route, and duration of antibiotics to be determined. Le Guerra DO, FACOI  [facility] Infectious Dise

## 2019-11-15 NOTE — PLAN OF CARE
Problem: Patient Centered Care  Goal: Patient preferences are identified and integrated in the patient's plan of care  Description  Interventions:  - What would you like us to know as we care for you?   - Provide timely, complete, and accurate informatio needed  - Instruct patient on self management of diabetes  Outcome: Progressing  Goal: Electrolytes maintained within normal limits  Description  INTERVENTIONS:  - Monitor labs and rhythm and assess patient for signs and symptoms of electrolyte imbalances on patient safety including physical limitations  - Instruct pt to call for assistance with activity based on assessment  - Modify environment to reduce risk of injury  - Provide assistive devices as appropriate  - Consider OT/PT consult to assist with str

## 2019-11-15 NOTE — PROGRESS NOTES
[facility] Hospitalist Team  Progress Note    Leanor Nims Patient Status:  Inpatient    1920 MRN [id number]   Location [facility] 5SW/SE Attending Fiona Hill MD   Hosp Day # 1 PCP Tia Henderson MD     CC: Follow Up  PCP: Tia Henderson am  -IVF,none  -diet-ADA     Prophy  -SCD  -heparin     Dispo  -pending clinical coarse  PCP: Carmita Coe MD        Concerns regarding plan of care were discussed with patient. Patient agrees with plan as detailed above.  Discussed plan of care with  Units, 5,000 Units, Subcutaneous, Q8H [facility] & halfway  acetaminophen (TYLENOL) tab 650 mg, 650 mg, Oral, Q6H PRN  PEG 3350 (MIRALAX) powder packet 17 g, 17 g, Oral, Daily PRN  bisacodyl (DULCOLAX) rectal suppository 10 mg, 10 mg, Rectal, Daily PRN  docusate sodium (CO ammended results posted post d/c on 11/12  -afebrile, WBC normal  -blood Cx pending  -WBC scan  -echo with EF 65%, mild MR, severe LAE, no vegatation  -zosyn, unclear allergy, had zosyn with admission 6/2019 with pseudomonas bacteremia, no adverse rx recor

## 2019-11-15 NOTE — OCCUPATIONAL THERAPY NOTE
Attempted to see Pt for OT eval, nurse approved Pt participation, Pt stated that she did not feel well and and did not want to participate in therapy at this time. Will f/u as scheduled allows.

## 2019-11-15 NOTE — PAYOR COMM NOTE
--------------  ADMISSION REVIEW     Payor: [facility] [employer] [address] [place] #:  [id number]  Authorization Number: [id number]    Admit date: 11/14/19  Admit time: 1442       Admitting Physician: Cj Ramos MD  Attending Physician:  Chino Peterson following components:    Urine Color Colorless (*)     All other components within normal limits   POCT GLUCOSE - Abnormal; Notable for the following components:    POC Glucose  133 (*)     All other components within normal limits   CBC W/ DIFFERENTIAL - mod-severe TR  -resume lasix  -follows with Dr Keith De La Cruz  CKD 3  -age related  -follow      Thyroid Goiter  -Noted on CXR  -TSH as outpt as per PCP     Breast lesion, on left breast  - noted on CT A/P 6/2019-1.7 cm breast lesion, discussed with patient a Soft, BS+; non distended, non tender    EXTREMITIES: no LE edema, no cyanosis    ALL:    Cefuroxime              UNKNOWN  Phenazopyridine         UNKNOWN     Home Medications:  No outpatient medications have been marked as taking for the 11/14/19 encounte related  -follow      Thyroid Goiter  -Noted on CXR  -TSH as outpt as per PCP     Breast lesion, on left breast  - noted on CT A/P 6/2019-1.7 cm breast lesion, discussed with patient and son on 6/2019 admission, defer to [facility]     Renal and pancreatic cyst/ Bacteremia     1.   Bacteremia with Eggerthella Lentum,   - 1/2 bottles grew Lalitha Hallman, drawn on 11/04 and turned positive on 11/12.  - No GI symptoms, no abd discomfort, no weight loss,   - was admitted for  infection at that time, cul with Kleb pending   - NM scan pending for possible occult source  - IV zosyn ongoing     2. Recent klebsiella UTI  - s/p treatement, no current  symptoms     3. Disposition - inpatient. NM scan is pending.   Continue IV zosyn for now and will follow with further Given 40 mEq Oral Callie Shaver RN    11/14/2019 1730 Given 40 mEq Oral Susan Ramirez RN      Vancomycin HCl (VANCOCIN) 1,750 mg in sodium chloride 0.9% 500 mL IVPB     Date Action Dose Route User    11/14/2019 1709 New Bag 1750 mg Intravenous Emanuel

## 2019-11-16 ENCOUNTER — APPOINTMENT (OUTPATIENT)
Dept: NUCLEAR MEDICINE | Facility: HOSPITAL | Age: 84
DRG: 872 | End: 2019-11-16
Attending: INTERNAL MEDICINE
Payer: MEDICARE

## 2019-11-16 PROCEDURE — 85025 COMPLETE CBC W/AUTO DIFF WBC: CPT | Performed by: NURSE PRACTITIONER

## 2019-11-16 PROCEDURE — 82962 GLUCOSE BLOOD TEST: CPT

## 2019-11-16 PROCEDURE — 80048 BASIC METABOLIC PNL TOTAL CA: CPT | Performed by: NURSE PRACTITIONER

## 2019-11-16 RX ORDER — FUROSEMIDE 20 MG/1
20 TABLET ORAL DAILY
Status: DISCONTINUED | OUTPATIENT
Start: 2019-11-17 | End: 2019-11-17

## 2019-11-16 NOTE — PROGRESS NOTES
[facility] AND [facility] Infectious Disease Progress Note    Gregory Worthington Patient Status:  Inpatient    1920 MRN [id number]   Location [facility] 5SW/SE Attending Rozina Ramires MD   Hosp Day # 2 PCP Tiffany Treviño MD     Subjective:  Zackary Merlin No apparent distress. Vital Signs:  Blood pressure 152/61, pulse 92, temperature 97.4 °F (36.3 °C), temperature source Oral, resp. rate 18, weight 139 lb 12.8 oz (63.4 kg), SpO2 95 %.    Temp (24hrs), Av.9 °F (36.6 °C), Min:97.4 °F (36.3 °C), Max:98.3

## 2019-11-16 NOTE — PROGRESS NOTES
[facility] Hospitalist Progress Note     CC: Hospital Follow up    PCP: Daniel Block MD       Assessment/Plan:     Principal Problem:    Bacteremia    Ms. Alley Tellez is R 79 year old female from  [facility] sig for HTN, GERD, DM (no longer on meds), diastolic chf, previ MD     Concerns regarding plan of care were discussed with patient. Patient agrees with plan as detailed above.      Nora Figueroa MD  [address] [facility] Team  Pager [phone]  Answering Service number: [phone]     Subjective: the last 168 hours.     Invalid input(s): ALPHOS, TBIL, DBIL, TPROT      Imaging:  Nm Infection Indium Wbc 111 Complete  (cpt=78806)    Result Date: 11/16/2019  CONCLUSION: Abnormal indium labeled leukocytes scan demonstrating focally increased uptake in th

## 2019-11-17 VITALS
RESPIRATION RATE: 18 BRPM | SYSTOLIC BLOOD PRESSURE: 146 MMHG | HEART RATE: 67 BPM | BODY MASS INDEX: 25 KG/M2 | WEIGHT: 140.19 LBS | DIASTOLIC BLOOD PRESSURE: 53 MMHG | OXYGEN SATURATION: 96 % | TEMPERATURE: 97 F

## 2019-11-17 PROCEDURE — 97166 OT EVAL MOD COMPLEX 45 MIN: CPT

## 2019-11-17 PROCEDURE — 80048 BASIC METABOLIC PNL TOTAL CA: CPT | Performed by: HOSPITALIST

## 2019-11-17 PROCEDURE — 97163 PT EVAL HIGH COMPLEX 45 MIN: CPT

## 2019-11-17 PROCEDURE — 97530 THERAPEUTIC ACTIVITIES: CPT

## 2019-11-17 PROCEDURE — 82962 GLUCOSE BLOOD TEST: CPT

## 2019-11-17 RX ORDER — METRONIDAZOLE 500 MG/1
500 TABLET ORAL 3 TIMES DAILY
Qty: 30 TABLET | Refills: 0 | Status: SHIPPED | OUTPATIENT
Start: 2019-11-17 | End: 2019-11-27

## 2019-11-17 NOTE — DISCHARGE SUMMARY
General Medicine Discharge Summary     Patient ID:  Kristina Fung  80year old  2/28/1920    Admit date: 11/14/2019    Discharge date and time: 11/17/19    Attending Physician: Kat Saez MD     Primary Care Physician: Pat Oreilly MD     [facility] 65%, mild MR, severe LAE, no vegatation  -zosyn, unclear allergy, had zosyn with admission 6/2019 with pseudomonas bacteremia, no adverse rx recorded in chart  -as per ID   -WBC scan today, abnormal results in L lung, await ID recs     Hypokalemia  -replet metRONIDAZOLE 500 MG Tabs  Commonly known as:  FLAGYL  Take 1 tablet (500 mg total) by mouth 3 (three) times daily for 10 days.         CONTINUE taking these medications    amLODIPine Besylate 5 MG Tabs  Commonly known as:  NORVASC     bacitracin zinc-sol

## 2019-11-17 NOTE — OCCUPATIONAL THERAPY NOTE
OCCUPATIONAL THERAPY EVALUATION - INPATIENT     Room Number: 554/554-A  Evaluation Date: 11/17/2019  Type of Evaluation: Initial  Presenting Problem: fever    Physician Order: IP Consult to Occupational Therapy  Reason for Therapy: ADL/IADL Dysfunction and transfer training; Endurance training;Patient/Family education; Compensatory technique education       OCCUPATIONAL THERAPY MEDICAL/SOCIAL HISTORY     Problem List  Principal Problem:    Bacteremia      Past Medical History  Past Medical History:   Diagnosis ASSESSMENT  Upper extremity strength is within functional limits     COORDINATION  Gross Motor: WFL   Fine Motor: WFL     ADDITIONAL TESTS                                    NEUROLOGICAL FINDINGS                   ACTIVITIES OF DAILY LIVING ASSESSMENT  AM-

## 2019-11-17 NOTE — PROGRESS NOTES
[facility] AND [facility] Infectious Disease  Progress Note    Kristina Fung Patient Status:  Inpatient    1920 MRN [id number]   Location [facility] 5SW/SE Attending Kat Saez MD   Hosp Day # 3 PCP Pat Oreilly MD     Subjective: be predictably susceptible to oral metronidazole and would recommend a 10 day course of metronidazole 500mg p.o. TID at discharge. Rx on chart. >35min spent at patient's bedside today in examination and coordination of today's plan of care.     Le Andersen

## 2019-11-17 NOTE — PLAN OF CARE
Patient called and was informed. She will have the ultrasound done today 4/2/18 after 5:00pm   The ultrasound department is aware. Dr. Sunshine Montalvo,  The patient would like you to look at her other results which are abnormal and is questioning why.    She also Problem: Patient Centered Care  Goal: Patient preferences are identified and integrated in the patient's plan of care  Description  Interventions:  - What would you like us to know as we care for you? - Provide timely, complete, and accurate information needed  - Instruct patient on self management of diabetes  Outcome: Progressing  Goal: Electrolytes maintained within normal limits  Description  INTERVENTIONS:  - Monitor labs and rhythm and assess patient for signs and symptoms of electrolyte imbalances on patient safety including physical limitations  - Instruct pt to call for assistance with activity based on assessment  - Modify environment to reduce risk of injury  - Provide assistive devices as appropriate  - Consider OT/PT consult to assist with str

## 2019-11-17 NOTE — PLAN OF CARE
Problem: Patient Centered Care  Goal: Patient preferences are identified and integrated in the patient's plan of care  Description  Interventions:  - What would you like us to know as we care for you?   - Provide timely, complete, and accurate informatio intake and initiate nutrition consult as needed  - Instruct patient on self management of diabetes  Outcome: Adequate for Discharge  Goal: Electrolytes maintained within normal limits  Description  INTERVENTIONS:  - Monitor labs and rhythm and assess patie falls.  - [place] fall precautions as indicated by assessment.  - Educate pt/family on patient safety including physical limitations  - Instruct pt to call for assistance with activity based on assessment  - Modify environment to reduce risk of injury  -

## 2019-11-17 NOTE — PHYSICAL THERAPY NOTE
PHYSICAL THERAPY EVALUATION - INPATIENT     Room Number: 554/554-A  Evaluation Date: 11/17/2019  Type of Evaluation: initial  Physician Order: PT Eval and Treat    Presenting Problem: Pt admitted w/ fever and abnormal lab(recent h/o hospitalization few da safe d/c.            DISCHARGE RECOMMENDATIONS  PT Discharge Recommendations: 24 hour care/supervision    PLAN  PT Treatment Plan: Bed mobility;Transfer training  Rehab Potential : Good  Frequency (Obs): 3-5x/week       PHYSICAL THERAPY MEDICAL/SOCIAL HIST another person does the patient currently need. ..   -   Moving to and from a bed to a chair (including a wheelchair)?: A Lot   -   Need to walk in hospital room?: Total   -   Climbing 3-5 steps with a railing?: Total     AM-PAC Score:  Raw Score: 13   Appr

## 2019-11-17 NOTE — CM/SW NOTE
KONG spoke with RN who states the pt is being dc'd back to her assisted living facility today. Pt/family requested [facility] and were informed by RN of the associated costs.  KONG arranged [facility] Mares for today 11/17 at 3pm. PCS form completed, copies on chart for

## 2019-11-18 NOTE — PAYOR COMM NOTE
--------------  DISCHARGE REVIEW    Payor: [facility] [address] #:  [id number]  Authorization Number: [id number]    Admit date: 11/14/19  Admit time:  1442  Discharge Date: 11/17/2019  3:14 PM     Admitting Physician: Mary Rebollar MD

## 2022-11-26 ENCOUNTER — HOSPITAL ENCOUNTER (INPATIENT)
Facility: HOSPITAL | Age: 87
LOS: 9 days | Discharge: NURSING, REHAB OR LONG-TERM CARE | DRG: 481 | End: 2022-12-05
Attending: EMERGENCY MEDICINE | Admitting: STUDENT IN AN ORGANIZED HEALTH CARE EDUCATION/TRAINING PROGRAM
Payer: MEDICARE

## 2022-11-26 ENCOUNTER — APPOINTMENT (OUTPATIENT)
Dept: GENERAL RADIOLOGY | Facility: HOSPITAL | Age: 87
DRG: 481 | End: 2022-11-26
Attending: EMERGENCY MEDICINE
Payer: MEDICARE

## 2022-11-26 DIAGNOSIS — S72.002A CLOSED FRACTURE OF LEFT HIP, INITIAL ENCOUNTER (HCC): Primary | ICD-10-CM

## 2022-11-26 LAB
ANION GAP SERPL CALC-SCNC: 8 MMOL/L (ref 0–18)
ANTIBODY SCREEN: NEGATIVE
BASOPHILS # BLD AUTO: 0.07 X10(3) UL (ref 0–0.2)
BASOPHILS NFR BLD AUTO: 0.4 %
BUN BLD-MCNC: 14 MG/DL (ref 7–18)
BUN/CREAT SERPL: 16.7 (ref 10–20)
CALCIUM BLD-MCNC: 8.4 MG/DL (ref 8.5–10.1)
CHLORIDE SERPL-SCNC: 105 MMOL/L (ref 98–112)
CO2 SERPL-SCNC: 22 MMOL/L (ref 21–32)
CREAT BLD-MCNC: 0.84 MG/DL
DEPRECATED RDW RBC AUTO: 42 FL (ref 35.1–46.3)
EOSINOPHIL # BLD AUTO: 0.11 X10(3) UL (ref 0–0.7)
EOSINOPHIL NFR BLD AUTO: 0.7 %
ERYTHROCYTE [DISTWIDTH] IN BLOOD BY AUTOMATED COUNT: 15.7 % (ref 11–15)
GFR SERPLBLD BASED ON 1.73 SQ M-ARVRAT: 61 ML/MIN/1.73M2 (ref 60–?)
GLUCOSE BLD-MCNC: 121 MG/DL (ref 70–99)
GLUCOSE BLDC GLUCOMTR-MCNC: 159 MG/DL (ref 70–99)
GLUCOSE BLDC GLUCOMTR-MCNC: 184 MG/DL (ref 70–99)
HCT VFR BLD AUTO: 26.5 %
HGB BLD-MCNC: 8.1 G/DL
IMM GRANULOCYTES # BLD AUTO: 0.1 X10(3) UL (ref 0–1)
IMM GRANULOCYTES NFR BLD: 0.6 %
INR BLD: 1.01 (ref 0.85–1.16)
LYMPHOCYTES # BLD AUTO: 0.82 X10(3) UL (ref 1–4)
LYMPHOCYTES NFR BLD AUTO: 5.2 %
MCH RBC QN AUTO: 22.5 PG (ref 26–34)
MCHC RBC AUTO-ENTMCNC: 30.6 G/DL (ref 31–37)
MCV RBC AUTO: 73.6 FL
MONOCYTES # BLD AUTO: 0.69 X10(3) UL (ref 0.1–1)
MONOCYTES NFR BLD AUTO: 4.4 %
NEUTROPHILS # BLD AUTO: 13.86 X10 (3) UL (ref 1.5–7.7)
NEUTROPHILS # BLD AUTO: 13.86 X10(3) UL (ref 1.5–7.7)
NEUTROPHILS NFR BLD AUTO: 88.7 %
OSMOLALITY SERPL CALC.SUM OF ELEC: 282 MOSM/KG (ref 275–295)
PLATELET # BLD AUTO: 335 10(3)UL (ref 150–450)
POTASSIUM SERPL-SCNC: 4 MMOL/L (ref 3.5–5.1)
PROTHROMBIN TIME: 13.2 SECONDS (ref 11.6–14.8)
RBC # BLD AUTO: 3.6 X10(6)UL
RH BLOOD TYPE: POSITIVE
RH BLOOD TYPE: POSITIVE
SARS-COV-2 RNA RESP QL NAA+PROBE: NOT DETECTED
SODIUM SERPL-SCNC: 135 MMOL/L (ref 136–145)
WBC # BLD AUTO: 15.7 X10(3) UL (ref 4–11)

## 2022-11-26 PROCEDURE — 86920 COMPATIBILITY TEST SPIN: CPT

## 2022-11-26 PROCEDURE — 82962 GLUCOSE BLOOD TEST: CPT

## 2022-11-26 PROCEDURE — 99285 EMERGENCY DEPT VISIT HI MDM: CPT

## 2022-11-26 PROCEDURE — 71045 X-RAY EXAM CHEST 1 VIEW: CPT | Performed by: EMERGENCY MEDICINE

## 2022-11-26 PROCEDURE — 96374 THER/PROPH/DIAG INJ IV PUSH: CPT

## 2022-11-26 PROCEDURE — 73552 X-RAY EXAM OF FEMUR 2/>: CPT | Performed by: EMERGENCY MEDICINE

## 2022-11-26 PROCEDURE — 86850 RBC ANTIBODY SCREEN: CPT | Performed by: EMERGENCY MEDICINE

## 2022-11-26 PROCEDURE — 80048 BASIC METABOLIC PNL TOTAL CA: CPT | Performed by: EMERGENCY MEDICINE

## 2022-11-26 PROCEDURE — 72170 X-RAY EXAM OF PELVIS: CPT | Performed by: EMERGENCY MEDICINE

## 2022-11-26 PROCEDURE — 93005 ELECTROCARDIOGRAM TRACING: CPT

## 2022-11-26 PROCEDURE — 85025 COMPLETE CBC W/AUTO DIFF WBC: CPT | Performed by: EMERGENCY MEDICINE

## 2022-11-26 PROCEDURE — 86900 BLOOD TYPING SEROLOGIC ABO: CPT | Performed by: EMERGENCY MEDICINE

## 2022-11-26 PROCEDURE — 96361 HYDRATE IV INFUSION ADD-ON: CPT

## 2022-11-26 PROCEDURE — 93010 ELECTROCARDIOGRAM REPORT: CPT | Performed by: INTERNAL MEDICINE

## 2022-11-26 PROCEDURE — 85610 PROTHROMBIN TIME: CPT | Performed by: EMERGENCY MEDICINE

## 2022-11-26 PROCEDURE — 86901 BLOOD TYPING SEROLOGIC RH(D): CPT | Performed by: EMERGENCY MEDICINE

## 2022-11-26 RX ORDER — DEXTROSE MONOHYDRATE 25 G/50ML
50 INJECTION, SOLUTION INTRAVENOUS
Status: DISCONTINUED | OUTPATIENT
Start: 2022-11-26 | End: 2022-12-05

## 2022-11-26 RX ORDER — TEMAZEPAM 7.5 MG/1
7.5 CAPSULE ORAL NIGHTLY PRN
Status: CANCELLED | OUTPATIENT
Start: 2022-11-26

## 2022-11-26 RX ORDER — ACETAMINOPHEN 500 MG
1000 TABLET ORAL ONCE
Status: COMPLETED | OUTPATIENT
Start: 2022-11-26 | End: 2022-11-26

## 2022-11-26 RX ORDER — GABAPENTIN 100 MG/1
100 CAPSULE ORAL EVERY 12 HOURS
Status: CANCELLED | OUTPATIENT
Start: 2022-11-26

## 2022-11-26 RX ORDER — SENNOSIDES 8.6 MG
17.2 TABLET ORAL NIGHTLY PRN
Status: DISCONTINUED | OUTPATIENT
Start: 2022-11-26 | End: 2022-12-05

## 2022-11-26 RX ORDER — ONDANSETRON 2 MG/ML
4 INJECTION INTRAMUSCULAR; INTRAVENOUS EVERY 6 HOURS PRN
Status: DISCONTINUED | OUTPATIENT
Start: 2022-11-26 | End: 2022-12-05

## 2022-11-26 RX ORDER — ACETAMINOPHEN 500 MG
500 TABLET ORAL EVERY 4 HOURS PRN
Status: DISCONTINUED | OUTPATIENT
Start: 2022-11-26 | End: 2022-12-05

## 2022-11-26 RX ORDER — METOCLOPRAMIDE HYDROCHLORIDE 5 MG/ML
5 INJECTION INTRAMUSCULAR; INTRAVENOUS EVERY 8 HOURS PRN
Status: DISCONTINUED | OUTPATIENT
Start: 2022-11-26 | End: 2022-12-05

## 2022-11-26 RX ORDER — PANTOPRAZOLE SODIUM 20 MG/1
20 TABLET, DELAYED RELEASE ORAL DAILY
Status: CANCELLED | OUTPATIENT
Start: 2022-11-26

## 2022-11-26 RX ORDER — BISACODYL 10 MG
10 SUPPOSITORY, RECTAL RECTAL
Status: DISCONTINUED | OUTPATIENT
Start: 2022-11-26 | End: 2022-12-05

## 2022-11-26 RX ORDER — MORPHINE SULFATE 2 MG/ML
2 INJECTION, SOLUTION INTRAMUSCULAR; INTRAVENOUS EVERY 2 HOUR PRN
Status: DISCONTINUED | OUTPATIENT
Start: 2022-11-26 | End: 2022-11-28

## 2022-11-26 RX ORDER — MORPHINE SULFATE 2 MG/ML
1 INJECTION, SOLUTION INTRAMUSCULAR; INTRAVENOUS EVERY 2 HOUR PRN
Status: DISCONTINUED | OUTPATIENT
Start: 2022-11-26 | End: 2022-11-28

## 2022-11-26 RX ORDER — NICOTINE POLACRILEX 4 MG
30 LOZENGE BUCCAL
Status: DISCONTINUED | OUTPATIENT
Start: 2022-11-26 | End: 2022-12-05

## 2022-11-26 RX ORDER — CALCIUM CARBONATE 200(500)MG
500 TABLET,CHEWABLE ORAL 3 TIMES DAILY PRN
Status: DISCONTINUED | OUTPATIENT
Start: 2022-11-26 | End: 2022-12-05

## 2022-11-26 RX ORDER — FAMOTIDINE 20 MG/1
20 TABLET, FILM COATED ORAL DAILY
Status: CANCELLED | OUTPATIENT
Start: 2022-11-26

## 2022-11-26 RX ORDER — METOCLOPRAMIDE HYDROCHLORIDE 5 MG/ML
10 INJECTION INTRAMUSCULAR; INTRAVENOUS EVERY 8 HOURS PRN
Status: DISCONTINUED | OUTPATIENT
Start: 2022-11-26 | End: 2022-11-26

## 2022-11-26 RX ORDER — SODIUM PHOSPHATE, DIBASIC AND SODIUM PHOSPHATE, MONOBASIC 7; 19 G/133ML; G/133ML
1 ENEMA RECTAL ONCE AS NEEDED
Status: DISCONTINUED | OUTPATIENT
Start: 2022-11-26 | End: 2022-12-05

## 2022-11-26 RX ORDER — NICOTINE POLACRILEX 4 MG
15 LOZENGE BUCCAL
Status: DISCONTINUED | OUTPATIENT
Start: 2022-11-26 | End: 2022-12-05

## 2022-11-26 RX ORDER — POLYETHYLENE GLYCOL 3350 17 G/17G
17 POWDER, FOR SOLUTION ORAL DAILY PRN
Status: DISCONTINUED | OUTPATIENT
Start: 2022-11-26 | End: 2022-12-05

## 2022-11-26 RX ORDER — ASPIRIN 325 MG
325 TABLET ORAL DAILY
COMMUNITY
End: 2022-12-05

## 2022-11-26 RX ORDER — MORPHINE SULFATE 4 MG/ML
4 INJECTION, SOLUTION INTRAMUSCULAR; INTRAVENOUS EVERY 2 HOUR PRN
Status: DISCONTINUED | OUTPATIENT
Start: 2022-11-26 | End: 2022-11-28

## 2022-11-26 RX ORDER — MORPHINE SULFATE 2 MG/ML
2 INJECTION, SOLUTION INTRAMUSCULAR; INTRAVENOUS ONCE
Status: COMPLETED | OUTPATIENT
Start: 2022-11-26 | End: 2022-11-26

## 2022-11-26 NOTE — PLAN OF CARE
Patient alert and oriented to self and person. ED admit after mechanical fall. Left femur fracture noted; ortho on consult. VSS. Remote tele in place. Monitoring blood glucose levels per order. Room air. Tylenol and morphine provided as needed for pain. Frequent repositioning. Fall precautions maintained. Frequent rounding by nursing staff. Plan is possible surgery tomorrow. Cardiac clearance needed.  Jh lCay would like to be contacted with updates in the AM.    Problem: Patient Centered Care  Goal: Patient preferences are identified and integrated in the patient's plan of care  Description: Interventions:  - What would you like us to know as we care for you?   - Provide timely, complete, and accurate information to patient/family  - Incorporate patient and family knowledge, values, beliefs, and cultural backgrounds into the planning and delivery of care  - Encourage patient/family to participate in care and decision-making at the level they choose  - Honor patient and family perspectives and choices  Outcome: Progressing     Problem: Patient/Family Goals  Goal: Patient/Family Long Term Goal  Description: Patient's Long Term Goal: pain management    Interventions:  - follow md orders  -take pain medication as needed  -non-pharmacologic therapy  -PT/OT  - See additional Care Plan goals for specific interventions  Outcome: Progressing  Goal: Patient/Family Short Term Goal  Description: Patient's Short Term Goal: to go home    Interventions:   - follow md orders  -monitor labs  -discharge planning  -update pt and family on plan of care  - See additional Care Plan goals for specific interventions  Outcome: Progressing     Problem: Diabetes/Glucose Control  Goal: Glucose maintained within prescribed range  Description: INTERVENTIONS:  - Monitor Blood Glucose as ordered  - Assess for signs and symptoms of hyperglycemia and hypoglycemia  - Administer ordered medications to maintain glucose within target range  - Assess barriers to adequate nutritional intake and initiate nutrition consult as needed  - Instruct patient on self management of diabetes  Outcome: Progressing

## 2022-11-26 NOTE — CONSULTS
Patient discussed with ED physician. Imaging reviewed. Indicated for left hip intramedullary nailing. Please keep NPO after midnight for case tomorrow.    - Please obtain appropriate clearance including hospital, cardiology (if indicated)

## 2022-11-26 NOTE — PROGRESS NOTES
[facility] Pharmacy Note:  Renal Dose Adjustment for Metoclopramide (REGLAN)    Merline Or has been prescribed Metoclopramide (REGLAN) 10 mg every 8 hours as needed for nausea/vomiting,. Crcl <30 ml/min    Calculated creatinine clearance is < 40 ml/min, therefore, the dose of Metoclopramide (REGLAN) has been changed to 5 mg every 8 hours as needed for nausea/vomiting per P&T approved protocol. Pharmacy will continue to follow, and if renal function improves, will resume the original order.        Thank you,  Coby Shoemaker, PharmD  11/26/2022 2:50 PM

## 2022-11-26 NOTE — ED QUICK NOTES
Orders for admission, patient is aware of plan and ready to go upstairs. Any questions, please call ED RN melissa at extension [phone].      Patient Covid vaccination status: Fully vaccinated     COVID Test Ordered in ED: Rapid SARS-CoV-2 by PCR    COVID Suspicion at Admission: Low clinical suspicion for COVID    Running Infusions:  None    Mental Status/LOC at time of transport: a/ox3, forgetful    Other pertinent information:   CIWA score: N/A   NIH score:  N/A

## 2022-11-27 ENCOUNTER — ANESTHESIA (OUTPATIENT)
Dept: SURGERY | Facility: HOSPITAL | Age: 87
End: 2022-11-27
Payer: MEDICARE

## 2022-11-27 ENCOUNTER — APPOINTMENT (OUTPATIENT)
Dept: GENERAL RADIOLOGY | Facility: HOSPITAL | Age: 87
DRG: 481 | End: 2022-11-27
Attending: STUDENT IN AN ORGANIZED HEALTH CARE EDUCATION/TRAINING PROGRAM
Payer: MEDICARE

## 2022-11-27 ENCOUNTER — ANESTHESIA EVENT (OUTPATIENT)
Dept: SURGERY | Facility: HOSPITAL | Age: 87
End: 2022-11-27
Payer: MEDICARE

## 2022-11-27 LAB
ANION GAP SERPL CALC-SCNC: 7 MMOL/L (ref 0–18)
ATRIAL RATE: 108 BPM
BASOPHILS # BLD AUTO: 0.07 X10(3) UL (ref 0–0.2)
BASOPHILS NFR BLD AUTO: 0.8 %
BILIRUB UR QL CFM: NEGATIVE
BUN BLD-MCNC: 16 MG/DL (ref 7–18)
BUN/CREAT SERPL: 20 (ref 10–20)
CALCIUM BLD-MCNC: 8.1 MG/DL (ref 8.5–10.1)
CHLORIDE SERPL-SCNC: 107 MMOL/L (ref 98–112)
CO2 SERPL-SCNC: 23 MMOL/L (ref 21–32)
COLOR UR: YELLOW
CREAT BLD-MCNC: 0.8 MG/DL
DEPRECATED RDW RBC AUTO: 43 FL (ref 35.1–46.3)
EOSINOPHIL # BLD AUTO: 0.14 X10(3) UL (ref 0–0.7)
EOSINOPHIL NFR BLD AUTO: 1.6 %
ERYTHROCYTE [DISTWIDTH] IN BLOOD BY AUTOMATED COUNT: 15.9 % (ref 11–15)
GFR SERPLBLD BASED ON 1.73 SQ M-ARVRAT: 65 ML/MIN/1.73M2 (ref 60–?)
GLUCOSE BLD-MCNC: 131 MG/DL (ref 70–99)
GLUCOSE BLDC GLUCOMTR-MCNC: 136 MG/DL (ref 70–99)
GLUCOSE BLDC GLUCOMTR-MCNC: 155 MG/DL (ref 70–99)
GLUCOSE BLDC GLUCOMTR-MCNC: 156 MG/DL (ref 70–99)
GLUCOSE BLDC GLUCOMTR-MCNC: 178 MG/DL (ref 70–99)
GLUCOSE BLDC GLUCOMTR-MCNC: 220 MG/DL (ref 70–99)
GLUCOSE UR-MCNC: NEGATIVE MG/DL
HCT VFR BLD AUTO: 23.3 %
HGB BLD-MCNC: 7.1 G/DL
IMM GRANULOCYTES # BLD AUTO: 0.06 X10(3) UL (ref 0–1)
IMM GRANULOCYTES NFR BLD: 0.7 %
KETONES UR-MCNC: 40 MG/DL
LYMPHOCYTES # BLD AUTO: 0.81 X10(3) UL (ref 1–4)
LYMPHOCYTES NFR BLD AUTO: 9.5 %
MCH RBC QN AUTO: 22.8 PG (ref 26–34)
MCHC RBC AUTO-ENTMCNC: 30.5 G/DL (ref 31–37)
MCV RBC AUTO: 74.7 FL
MONOCYTES # BLD AUTO: 0.67 X10(3) UL (ref 0.1–1)
MONOCYTES NFR BLD AUTO: 7.9 %
MRSA DNA SPEC QL NAA+PROBE: NEGATIVE
NEUTROPHILS # BLD AUTO: 6.76 X10 (3) UL (ref 1.5–7.7)
NEUTROPHILS # BLD AUTO: 6.76 X10(3) UL (ref 1.5–7.7)
NEUTROPHILS NFR BLD AUTO: 79.5 %
NITRITE UR QL STRIP.AUTO: POSITIVE
OSMOLALITY SERPL CALC.SUM OF ELEC: 287 MOSM/KG (ref 275–295)
P AXIS: 74 DEGREES
P-R INTERVAL: 164 MS
PH UR: 5.5 [PH] (ref 5–8)
PLATELET # BLD AUTO: 289 10(3)UL (ref 150–450)
POTASSIUM SERPL-SCNC: 3.9 MMOL/L (ref 3.5–5.1)
Q-T INTERVAL: 322 MS
QRS DURATION: 76 MS
QTC CALCULATION (BEZET): 431 MS
R AXIS: -25 DEGREES
RBC # BLD AUTO: 3.12 X10(6)UL
SODIUM SERPL-SCNC: 137 MMOL/L (ref 136–145)
SP GR UR STRIP: >=1.03 (ref 1–1.03)
T AXIS: -29 DEGREES
UROBILINOGEN UR STRIP-ACNC: 1
VENTRICULAR RATE: 108 BPM
WBC # BLD AUTO: 8.5 X10(3) UL (ref 4–11)
WBC #/AREA URNS AUTO: >50 /HPF
WBC #/AREA URNS AUTO: >50 /HPF

## 2022-11-27 PROCEDURE — 81015 MICROSCOPIC EXAM OF URINE: CPT | Performed by: STUDENT IN AN ORGANIZED HEALTH CARE EDUCATION/TRAINING PROGRAM

## 2022-11-27 PROCEDURE — 36430 TRANSFUSION BLD/BLD COMPNT: CPT

## 2022-11-27 PROCEDURE — 82962 GLUCOSE BLOOD TEST: CPT

## 2022-11-27 PROCEDURE — 81001 URINALYSIS AUTO W/SCOPE: CPT | Performed by: STUDENT IN AN ORGANIZED HEALTH CARE EDUCATION/TRAINING PROGRAM

## 2022-11-27 PROCEDURE — 85025 COMPLETE CBC W/AUTO DIFF WBC: CPT | Performed by: STUDENT IN AN ORGANIZED HEALTH CARE EDUCATION/TRAINING PROGRAM

## 2022-11-27 PROCEDURE — 87186 SC STD MICRODIL/AGAR DIL: CPT | Performed by: STUDENT IN AN ORGANIZED HEALTH CARE EDUCATION/TRAINING PROGRAM

## 2022-11-27 PROCEDURE — 87641 MR-STAPH DNA AMP PROBE: CPT | Performed by: STUDENT IN AN ORGANIZED HEALTH CARE EDUCATION/TRAINING PROGRAM

## 2022-11-27 PROCEDURE — 87077 CULTURE AEROBIC IDENTIFY: CPT | Performed by: STUDENT IN AN ORGANIZED HEALTH CARE EDUCATION/TRAINING PROGRAM

## 2022-11-27 PROCEDURE — 87086 URINE CULTURE/COLONY COUNT: CPT | Performed by: STUDENT IN AN ORGANIZED HEALTH CARE EDUCATION/TRAINING PROGRAM

## 2022-11-27 PROCEDURE — 76000 FLUOROSCOPY <1 HR PHYS/QHP: CPT | Performed by: STUDENT IN AN ORGANIZED HEALTH CARE EDUCATION/TRAINING PROGRAM

## 2022-11-27 PROCEDURE — 80048 BASIC METABOLIC PNL TOTAL CA: CPT | Performed by: STUDENT IN AN ORGANIZED HEALTH CARE EDUCATION/TRAINING PROGRAM

## 2022-11-27 PROCEDURE — 30233N1 TRANSFUSION OF NONAUTOLOGOUS RED BLOOD CELLS INTO PERIPHERAL VEIN, PERCUTANEOUS APPROACH: ICD-10-PCS | Performed by: HOSPITALIST

## 2022-11-27 PROCEDURE — 0QH736Z INSERTION OF INTRAMEDULLARY INTERNAL FIXATION DEVICE INTO LEFT UPPER FEMUR, PERCUTANEOUS APPROACH: ICD-10-PCS | Performed by: STUDENT IN AN ORGANIZED HEALTH CARE EDUCATION/TRAINING PROGRAM

## 2022-11-27 DEVICE — LOCKING SCREW, FULLY THREADED: Type: IMPLANTABLE DEVICE | Site: HIP | Status: FUNCTIONAL

## 2022-11-27 DEVICE — LAG SCREW, TI
Type: IMPLANTABLE DEVICE | Site: HIP | Status: FUNCTIONAL
Brand: GAMMA

## 2022-11-27 RX ORDER — ENOXAPARIN SODIUM 100 MG/ML
40 INJECTION SUBCUTANEOUS EVERY 24 HOURS
Status: DISCONTINUED | OUTPATIENT
Start: 2022-11-28 | End: 2022-12-05

## 2022-11-27 RX ORDER — SODIUM CHLORIDE 9 MG/ML
INJECTION, SOLUTION INTRAVENOUS ONCE
Status: DISCONTINUED | OUTPATIENT
Start: 2022-11-27 | End: 2022-12-05

## 2022-11-27 RX ORDER — MORPHINE SULFATE 10 MG/ML
6 INJECTION, SOLUTION INTRAMUSCULAR; INTRAVENOUS EVERY 10 MIN PRN
Status: DISCONTINUED | OUTPATIENT
Start: 2022-11-27 | End: 2022-11-27 | Stop reason: HOSPADM

## 2022-11-27 RX ORDER — SODIUM CHLORIDE 9 MG/ML
INJECTION, SOLUTION INTRAVENOUS CONTINUOUS PRN
Status: DISCONTINUED | OUTPATIENT
Start: 2022-11-27 | End: 2022-11-27 | Stop reason: SURG

## 2022-11-27 RX ORDER — MORPHINE SULFATE 4 MG/ML
2 INJECTION, SOLUTION INTRAMUSCULAR; INTRAVENOUS EVERY 10 MIN PRN
Status: DISCONTINUED | OUTPATIENT
Start: 2022-11-27 | End: 2022-11-27 | Stop reason: HOSPADM

## 2022-11-27 RX ORDER — POLYETHYLENE GLYCOL 3350 17 G/17G
17 POWDER, FOR SOLUTION ORAL DAILY PRN
Status: DISCONTINUED | OUTPATIENT
Start: 2022-11-27 | End: 2022-11-27

## 2022-11-27 RX ORDER — METOCLOPRAMIDE HYDROCHLORIDE 5 MG/ML
10 INJECTION INTRAMUSCULAR; INTRAVENOUS EVERY 8 HOURS PRN
Status: DISCONTINUED | OUTPATIENT
Start: 2022-11-27 | End: 2022-11-27 | Stop reason: HOSPADM

## 2022-11-27 RX ORDER — DOXEPIN HYDROCHLORIDE 50 MG/1
1 CAPSULE ORAL DAILY
Status: DISCONTINUED | OUTPATIENT
Start: 2022-11-28 | End: 2022-12-05

## 2022-11-27 RX ORDER — HYDROMORPHONE HYDROCHLORIDE 1 MG/ML
0.6 INJECTION, SOLUTION INTRAMUSCULAR; INTRAVENOUS; SUBCUTANEOUS EVERY 5 MIN PRN
Status: DISCONTINUED | OUTPATIENT
Start: 2022-11-27 | End: 2022-11-27 | Stop reason: HOSPADM

## 2022-11-27 RX ORDER — DEXAMETHASONE SODIUM PHOSPHATE 4 MG/ML
4 VIAL (ML) INJECTION ONCE AS NEEDED
Status: COMPLETED | OUTPATIENT
Start: 2022-11-27 | End: 2022-11-27

## 2022-11-27 RX ORDER — ONDANSETRON 2 MG/ML
4 INJECTION INTRAMUSCULAR; INTRAVENOUS EVERY 6 HOURS PRN
Status: DISCONTINUED | OUTPATIENT
Start: 2022-11-27 | End: 2022-11-27

## 2022-11-27 RX ORDER — ONDANSETRON 2 MG/ML
INJECTION INTRAMUSCULAR; INTRAVENOUS AS NEEDED
Status: DISCONTINUED | OUTPATIENT
Start: 2022-11-27 | End: 2022-11-27 | Stop reason: SURG

## 2022-11-27 RX ORDER — CEFAZOLIN SODIUM/WATER 2 G/20 ML
SYRINGE (ML) INTRAVENOUS AS NEEDED
Status: DISCONTINUED | OUTPATIENT
Start: 2022-11-27 | End: 2022-11-27 | Stop reason: SURG

## 2022-11-27 RX ORDER — BISACODYL 10 MG
10 SUPPOSITORY, RECTAL RECTAL
Status: DISCONTINUED | OUTPATIENT
Start: 2022-11-27 | End: 2022-11-27

## 2022-11-27 RX ORDER — METOCLOPRAMIDE HYDROCHLORIDE 5 MG/ML
10 INJECTION INTRAMUSCULAR; INTRAVENOUS EVERY 8 HOURS PRN
Status: DISCONTINUED | OUTPATIENT
Start: 2022-11-27 | End: 2022-11-27

## 2022-11-27 RX ORDER — ONDANSETRON 2 MG/ML
4 INJECTION INTRAMUSCULAR; INTRAVENOUS EVERY 6 HOURS PRN
Status: DISCONTINUED | OUTPATIENT
Start: 2022-11-27 | End: 2022-11-27 | Stop reason: HOSPADM

## 2022-11-27 RX ORDER — HYDROMORPHONE HYDROCHLORIDE 1 MG/ML
0.2 INJECTION, SOLUTION INTRAMUSCULAR; INTRAVENOUS; SUBCUTANEOUS EVERY 5 MIN PRN
Status: DISCONTINUED | OUTPATIENT
Start: 2022-11-27 | End: 2022-11-27 | Stop reason: HOSPADM

## 2022-11-27 RX ORDER — DOCUSATE SODIUM 100 MG/1
100 CAPSULE, LIQUID FILLED ORAL 2 TIMES DAILY
Status: DISCONTINUED | OUTPATIENT
Start: 2022-11-27 | End: 2022-12-05

## 2022-11-27 RX ORDER — SODIUM CHLORIDE, SODIUM LACTATE, POTASSIUM CHLORIDE, CALCIUM CHLORIDE 600; 310; 30; 20 MG/100ML; MG/100ML; MG/100ML; MG/100ML
INJECTION, SOLUTION INTRAVENOUS CONTINUOUS
Status: DISCONTINUED | OUTPATIENT
Start: 2022-11-27 | End: 2022-11-27 | Stop reason: HOSPADM

## 2022-11-27 RX ORDER — NALOXONE HYDROCHLORIDE 0.4 MG/ML
80 INJECTION, SOLUTION INTRAMUSCULAR; INTRAVENOUS; SUBCUTANEOUS AS NEEDED
Status: DISCONTINUED | OUTPATIENT
Start: 2022-11-27 | End: 2022-11-27 | Stop reason: HOSPADM

## 2022-11-27 RX ORDER — TRAMADOL HYDROCHLORIDE 50 MG/1
50 TABLET ORAL EVERY 12 HOURS
Status: DISCONTINUED | OUTPATIENT
Start: 2022-11-27 | End: 2022-11-28

## 2022-11-27 RX ORDER — ACETAMINOPHEN 500 MG
1000 TABLET ORAL EVERY 8 HOURS SCHEDULED
Status: DISCONTINUED | OUTPATIENT
Start: 2022-11-27 | End: 2022-12-05

## 2022-11-27 RX ORDER — SENNOSIDES 8.6 MG
17.2 TABLET ORAL NIGHTLY
Status: DISCONTINUED | OUTPATIENT
Start: 2022-11-27 | End: 2022-12-05

## 2022-11-27 RX ORDER — CEFAZOLIN SODIUM/WATER 2 G/20 ML
2 SYRINGE (ML) INTRAVENOUS EVERY 8 HOURS
Status: COMPLETED | OUTPATIENT
Start: 2022-11-28 | End: 2022-11-28

## 2022-11-27 RX ORDER — SODIUM PHOSPHATE, DIBASIC AND SODIUM PHOSPHATE, MONOBASIC 7; 19 G/133ML; G/133ML
1 ENEMA RECTAL ONCE AS NEEDED
Status: DISCONTINUED | OUTPATIENT
Start: 2022-11-27 | End: 2022-11-27

## 2022-11-27 RX ORDER — HYDROMORPHONE HYDROCHLORIDE 1 MG/ML
0.4 INJECTION, SOLUTION INTRAMUSCULAR; INTRAVENOUS; SUBCUTANEOUS EVERY 5 MIN PRN
Status: DISCONTINUED | OUTPATIENT
Start: 2022-11-27 | End: 2022-11-27 | Stop reason: HOSPADM

## 2022-11-27 RX ORDER — MORPHINE SULFATE 4 MG/ML
4 INJECTION, SOLUTION INTRAMUSCULAR; INTRAVENOUS EVERY 10 MIN PRN
Status: DISCONTINUED | OUTPATIENT
Start: 2022-11-27 | End: 2022-11-27 | Stop reason: HOSPADM

## 2022-11-27 RX ADMIN — SODIUM CHLORIDE: 9 INJECTION, SOLUTION INTRAVENOUS at 16:47:00

## 2022-11-27 RX ADMIN — SODIUM CHLORIDE: 9 INJECTION, SOLUTION INTRAVENOUS at 15:37:00

## 2022-11-27 RX ADMIN — CEFAZOLIN SODIUM/WATER 2 G: 2 G/20 ML SYRINGE (ML) INTRAVENOUS at 15:46:00

## 2022-11-27 RX ADMIN — ONDANSETRON 4 MG: 2 INJECTION INTRAMUSCULAR; INTRAVENOUS at 15:45:00

## 2022-11-27 NOTE — ANESTHESIA PROCEDURE NOTES
Airway  Date/Time: 11/27/2022 3:45 PM  Urgency: emergent    Airway not difficult    General Information and Staff    Patient location during procedure: OR  Anesthesiologist: René Zendejas MD  Performed: anesthesiologist     Consent for Airway (if performed for an anesthetic, see related documentation for consents)  Consent: The procedure was performed in an emergent situation. Verbal consent obtained. Written consent obtained.   Consent given by: patient      Indications and Patient Condition  Indications for airway management: anesthesia  Spontaneous Ventilation: absent  Sedation level: deep  Preoxygenated: yes  Patient position: sniffing  Mask difficulty assessment: 1 - vent by mask  Planned trial extubation    Final Airway Details  Final airway type: endotracheal airway      Cuffed: yes   Successful intubation technique: direct laryngoscopy  Facilitating devices/methods: cricoid pressure and intubating stylet  Endotracheal tube insertion site: oral  Blade: Giovanni  Blade size: #3    Cormack-Lehane Classification: grade I - full view of glottis  Placement verified by: chest auscultation and capnometry   Measured from: teeth  ETT to teeth (cm): 20  Number of attempts at approach: 1  Number of other approaches attempted: 0

## 2022-11-27 NOTE — PLAN OF CARE
Patient alert to self only. More confused at night. Pain controlled with IV morphine. NPO at midnight. SCDs to RLE for DVT prophylaxis. Patient scratching at her IV site and caused skin tear. Cleansed and Band-Aid applied. Heating pad applied to right hip. Patient did not want ice. Remains at bedrest. Blood sugar monitored per MD order. Plan for surgery today at 56 with Dr. Guzman November. Call light within reach. Fall precautions maintained. Monitored closely by staff. Addendum:  Urine specimen not received in lab. Need to collect urine specimen for UA w/ reflex culture. Problem: Patient Centered Care  Goal: Patient preferences are identified and integrated in the patient's plan of care  Description: Interventions:  - What would you like us to know as we care for you?  I just want to go home  - Provide timely, complete, and accurate information to patient/family  - Incorporate patient and family knowledge, values, beliefs, and cultural backgrounds into the planning and delivery of care  - Encourage patient/family to participate in care and decision-making at the level they choose  - Honor patient and family perspectives and choices  11/27/2022 0304 by Brain Rodriguez RN  Outcome: Progressing  11/27/2022 0304 by Brain Rodriguez RN  Outcome: Progressing     Problem: Patient/Family Goals  Goal: Patient/Family Long Term Goal  Description: Patient's Long Term Goal: to go home    Interventions:  - follow ortho consult  - See additional Care Plan goals for specific interventions  11/27/2022 0304 by Brain Rodriguez RN  Outcome: Progressing  11/27/2022 0304 by Brain Rodriguez RN  Outcome: Progressing  Goal: Patient/Family Short Term Goal  Description: Patient's Short Term Goal: pain less than 3     Interventions:   - follow ortho consult  -maintain regular schedule of pain meds    - See additional Care Plan goals for specific interventions  11/27/2022 0304 by Barin Rodriguez RN  Outcome: Progressing  11/27/2022 0304 by Mis Chin RN  Outcome: Progressing     Problem: Diabetes/Glucose Control  Goal: Glucose maintained within prescribed range  Description: INTERVENTIONS:  - Monitor Blood Glucose as ordered  - Assess for signs and symptoms of hyperglycemia and hypoglycemia  - Administer ordered medications to maintain glucose within target range  - Assess barriers to adequate nutritional intake and initiate nutrition consult as needed  - Instruct patient on self management of diabetes  Outcome: Progressing     Problem: PAIN - ADULT  Goal: Verbalizes/displays adequate comfort level or patient's stated pain goal  Description: INTERVENTIONS:  - Encourage pt to monitor pain and request assistance  - Assess pain using appropriate pain scale  - Administer analgesics based on type and severity of pain and evaluate response  - Implement non-pharmacological measures as appropriate and evaluate response  - Consider cultural and social influences on pain and pain management  - Manage/alleviate anxiety  - Utilize distraction and/or relaxation techniques  - Monitor for opioid side effects  - Notify MD/LIP if interventions unsuccessful or patient reports new pain  - Anticipate increased pain with activity and pre-medicate as appropriate  Outcome: Progressing     Problem: SAFETY ADULT - FALL  Goal: Free from fall injury  Description: INTERVENTIONS:  - Assess pt frequently for physical needs  - Identify cognitive and physical deficits and behaviors that affect risk of falls.   - [place] fall precautions as indicated by assessment.  - Educate pt/family on patient safety including physical limitations  - Instruct pt to call for assistance with activity based on assessment  - Modify environment to reduce risk of injury  - Provide assistive devices as appropriate  - Consider OT/PT consult to assist with strengthening/mobility  - Encourage toileting schedule  Outcome: Progressing

## 2022-11-27 NOTE — OPERATIVE REPORT
OPERATIVE REPORT    Facility:  [facility]    Patient Name:  Elisa Rogers    Age/Gender:  8 year old female  :  1920    MRN:  [id number]    Date of Operation:  2022    Preoperative Diagnosis:  LEFT HIP FRACTURE    Postoperative Diagnosis:  LEFT HIP FRACTURE    Procedure Performed:  LEFT HIP LONG INTRAMEDULLARY NAIL    Surgeon:  Srinivasa Wang M.D. Assistant:  KELLI Leavitt    Anesthesia:  GENERAL    Estimated Blood Loss:  100 mL    Drain:  NONE    Complications:  NONE    Indications for Procedure: The patient is a 80 who had a ground-level fall and sustained a left intertrochanteric femur fracture. A discussion of operative and nonoperative treatments with the patient and/or family. She was admitted to the internal medicine service and cleared for surgery. The patient was met in the preoperative holding area and the correct site was identified and marked. She was taken to the operating room and placed under general anesthesia. She was transferred to the operating table, and the operative extremity was placed in in the traction boot. Using the traction table, a closed reduction was performed and confirmed on fluoroscopy. The extremity was then prepped and draped in the usual sterile fashion. A 1 cm stab incision was made proximal to the hip and the starting guidewire was advanced to the soft tissue to the start point on the medial tip of the greater trochanter. On AP and lateral views it was confirmed to be central and aiming down the canal the femur. The wire was then advanced on power just past the level of the lesser trochanter. The stab incision was widened to about 4 cm, and the deep fascia was incised sharply with a knife. The entry reamer was then inserted and advanced on power into the proximal femur. Next, a ball-tipped guidewire was advanced down the canal the femur and seated in a central position in the distal aspect of the femur.   The measuring device was used to determine the length of the nail. The femoral canal was sequentially reamed to 1.5 mm above the nail diameter. A long Sarah Gamma 11 x 380 mm nail was selected. The nail was attached to the jig and inserted into the femur. It was advanced down the canal of the femur with gentle malleting to the appropriate depth. A small stab incision was made on the lateral thigh for the aiming sleeve for the femoral head screw. The guidewire for the femoral head screw was then inserted through the aiming sleeve and driven into the femoral head. Fluoroscopy confirmed center center positioning of the guidewire and appropriate tip-apex distance. The screw path was then drilled over the wire. A 105 mm head screw was inserted over the wire and advanced on hand into the femoral head. The nail was then statically locked using the setscrew device through the proximal portion of the jig. Next, the nail was locked using the perfect circles method for the distal interlocking holes. A 5.0 x 55 mm distal locking screw was drilled and placed bicortically in static position. At this point the jig was removed and final fluoroscopy images were obtained confirming appropriate positioning of all implants and appropriate alignment of the fracture. The wounds were then thoroughly irrigated with normal saline. The incisions were closed in a layered fashion with 0 Vicryl in the deep fascia, 2-0 monocryl, and staples for the skin. A sterile dressing was applied. The patient was removed safely from the traction table and transferred to the hospital bed. The patient was then awoken from general anesthesia and taken to the PACU in stable condition.     Angeli Jones M.D.

## 2022-11-27 NOTE — PLAN OF CARE
Patient alert and oriented x2. Consents in chart and signed by son Jane Recio who is at bedside. Morphine and tylenol provided as needed for pain. Hgb this AM 7.1, MD notified. PRBC's transfusing. Pre op checklist completed. Pt ready for surgery.

## 2022-11-27 NOTE — PHYSICAL THERAPY NOTE
PT order received and chart review complete. Pt scheduled for L hip IM nailing today. Will follow up for PT evaluation tomorrow as appropriate and able.

## 2022-11-27 NOTE — OCCUPATIONAL THERAPY NOTE
OT order received and chart review complete. Pt scheduled for L hip IM nailing today. Will follow up for OT evaluation tomorrow as appropriate and able. Thanks!

## 2022-11-28 LAB
ANION GAP SERPL CALC-SCNC: 8 MMOL/L (ref 0–18)
BLOOD TYPE BARCODE: 5100
BUN BLD-MCNC: 22 MG/DL (ref 7–18)
BUN/CREAT SERPL: 18.3 (ref 10–20)
CALCIUM BLD-MCNC: 8.2 MG/DL (ref 8.5–10.1)
CHLORIDE SERPL-SCNC: 107 MMOL/L (ref 98–112)
CO2 SERPL-SCNC: 23 MMOL/L (ref 21–32)
CREAT BLD-MCNC: 1.2 MG/DL
EST. AVERAGE GLUCOSE BLD GHB EST-MCNC: 126 MG/DL (ref 68–126)
GFR SERPLBLD BASED ON 1.73 SQ M-ARVRAT: 40 ML/MIN/1.73M2 (ref 60–?)
GLUCOSE BLD-MCNC: 207 MG/DL (ref 70–99)
GLUCOSE BLDC GLUCOMTR-MCNC: 200 MG/DL (ref 70–99)
GLUCOSE BLDC GLUCOMTR-MCNC: 214 MG/DL (ref 70–99)
GLUCOSE BLDC GLUCOMTR-MCNC: 223 MG/DL (ref 70–99)
GLUCOSE BLDC GLUCOMTR-MCNC: 255 MG/DL (ref 70–99)
HBA1C MFR BLD: 6 % (ref ?–5.7)
HCT VFR BLD AUTO: 22.1 %
HCT VFR BLD AUTO: 23.2 %
HCT VFR BLD AUTO: 25.5 %
HGB BLD-MCNC: 6.8 G/DL
HGB BLD-MCNC: 7.2 G/DL
HGB BLD-MCNC: 7.9 G/DL
OSMOLALITY SERPL CALC.SUM OF ELEC: 295 MOSM/KG (ref 275–295)
POTASSIUM SERPL-SCNC: 4.5 MMOL/L (ref 3.5–5.1)
SODIUM SERPL-SCNC: 138 MMOL/L (ref 136–145)

## 2022-11-28 PROCEDURE — 85018 HEMOGLOBIN: CPT | Performed by: STUDENT IN AN ORGANIZED HEALTH CARE EDUCATION/TRAINING PROGRAM

## 2022-11-28 PROCEDURE — 85014 HEMATOCRIT: CPT | Performed by: STUDENT IN AN ORGANIZED HEALTH CARE EDUCATION/TRAINING PROGRAM

## 2022-11-28 PROCEDURE — 80048 BASIC METABOLIC PNL TOTAL CA: CPT | Performed by: NURSE PRACTITIONER

## 2022-11-28 PROCEDURE — 97530 THERAPEUTIC ACTIVITIES: CPT

## 2022-11-28 PROCEDURE — 97535 SELF CARE MNGMENT TRAINING: CPT

## 2022-11-28 PROCEDURE — 83036 HEMOGLOBIN GLYCOSYLATED A1C: CPT | Performed by: NURSE PRACTITIONER

## 2022-11-28 PROCEDURE — 85014 HEMATOCRIT: CPT | Performed by: NURSE PRACTITIONER

## 2022-11-28 PROCEDURE — 97166 OT EVAL MOD COMPLEX 45 MIN: CPT

## 2022-11-28 PROCEDURE — 36430 TRANSFUSION BLD/BLD COMPNT: CPT

## 2022-11-28 PROCEDURE — 97162 PT EVAL MOD COMPLEX 30 MIN: CPT

## 2022-11-28 PROCEDURE — 85018 HEMOGLOBIN: CPT | Performed by: NURSE PRACTITIONER

## 2022-11-28 PROCEDURE — 82962 GLUCOSE BLOOD TEST: CPT

## 2022-11-28 RX ORDER — SODIUM CHLORIDE, SODIUM LACTATE, POTASSIUM CHLORIDE, CALCIUM CHLORIDE 600; 310; 30; 20 MG/100ML; MG/100ML; MG/100ML; MG/100ML
INJECTION, SOLUTION INTRAVENOUS CONTINUOUS
Status: ACTIVE | OUTPATIENT
Start: 2022-11-28 | End: 2022-11-29

## 2022-11-28 RX ORDER — MORPHINE SULFATE 4 MG/ML
4 INJECTION, SOLUTION INTRAMUSCULAR; INTRAVENOUS EVERY 2 HOUR PRN
Status: DISCONTINUED | OUTPATIENT
Start: 2022-11-28 | End: 2022-11-28

## 2022-11-28 RX ORDER — MORPHINE SULFATE 2 MG/ML
1 INJECTION, SOLUTION INTRAMUSCULAR; INTRAVENOUS EVERY 2 HOUR PRN
Status: DISCONTINUED | OUTPATIENT
Start: 2022-11-28 | End: 2022-11-29

## 2022-11-28 RX ORDER — CARBOXYMETHYLCELLULOSE SODIUM 5 MG/ML
SOLUTION/ DROPS OPHTHALMIC
COMMUNITY
Start: 2022-01-07 | End: 2022-12-05

## 2022-11-28 RX ORDER — SODIUM CHLORIDE 9 MG/ML
INJECTION, SOLUTION INTRAVENOUS ONCE
Status: COMPLETED | OUTPATIENT
Start: 2022-11-28 | End: 2022-11-28

## 2022-11-28 RX ORDER — TRAMADOL HYDROCHLORIDE 50 MG/1
50 TABLET ORAL EVERY 12 HOURS PRN
Status: DISCONTINUED | OUTPATIENT
Start: 2022-11-28 | End: 2022-12-05

## 2022-11-28 RX ORDER — FAMOTIDINE 20 MG/1
20 TABLET, FILM COATED ORAL NIGHTLY
Status: DISCONTINUED | OUTPATIENT
Start: 2022-11-28 | End: 2022-11-28

## 2022-11-28 RX ORDER — PANTOPRAZOLE SODIUM 20 MG/1
20 TABLET, DELAYED RELEASE ORAL DAILY
Status: DISCONTINUED | OUTPATIENT
Start: 2022-11-28 | End: 2022-12-05

## 2022-11-28 NOTE — CM/SW NOTE
Department  notified of request for [facility] referrals started. Assigned CM/SW to follow up with pt/family on further discharge planning.      Haley Emilys   November 28, 2022   09:30

## 2022-11-28 NOTE — CM/SW NOTE
Per chart review pt is from [address] [place] [address]. Pt admits to [facility] post fall with left hip Femur Fx. PT/OT eval pending. CM requested [address] [place] [address] to send [facility] referrals in anticipation of dc needs. *Will f/u when [facility] list generated and PT/OT eval/ recs complete. Hemant Maurer.  Ericka Rosen RN, BSN  Nurse   [phone]

## 2022-11-28 NOTE — PLAN OF CARE
Post-op day #1. Dressing in place to left hip. Monitoring vital signs- stable at this time. No acute changes noted throughout shift. Monitoring blood glucose levels per order. Tolerating diet. Voiding using purewick. Patient is on 2L/min of oxygen via nasal cannula. SCDs and Lovenox subcutaneous for DVT prophylaxis. Has tylenol and tramadol scheduled for pain. Encouraged frequent use of incentive spirometer. Fall precautions maintained- bed alarm on, bed locked in lowest position, call light and personal belongings within reach, non-skid socks in place to bilateral feet. Frequent rounding by nursing staff. Plan to discharge to [facility] once medically cleared. Addendum:  Low urine output. MD paged. Awaiting response. Problem: Patient Centered Care  Goal: Patient preferences are identified and integrated in the patient's plan of care  Description: Interventions:  - What would you like us to know as we care for you?  I want to see the hallway  - Provide timely, complete, and accurate information to patient/family  - Incorporate patient and family knowledge, values, beliefs, and cultural backgrounds into the planning and delivery of care  - Encourage patient/family to participate in care and decision-making at the level they choose  - Honor patient and family perspectives and choices  11/28/2022 0210 by Pepe Chen, RN  Outcome: Progressing  11/28/2022 0210 by Pepe Chen RN  Outcome: Progressing     Problem: Patient/Family Goals  Goal: Patient/Family Long Term Goal  Description: Patient's Long Term Goal: go home ([facility])    Interventions:  - follow PT/OT recommendations  -follow MD orders  - See additional Care Plan goals for specific interventions  Outcome: Progressing  Goal: Patient/Family Short Term Goal  Description: Patient's Short Term Goal: better pain control    Interventions:   - follow pain med scheduled  -ice as needed  - See additional Care Plan goals for specific interventions  11/28/2022 0210 by Kathy Nino RN  Outcome: Progressing  11/28/2022 0210 by Kathy Nino RN  Outcome: Progressing     Problem: Diabetes/Glucose Control  Goal: Glucose maintained within prescribed range  Description: INTERVENTIONS:  - Monitor Blood Glucose as ordered  - Assess for signs and symptoms of hyperglycemia and hypoglycemia  - Administer ordered medications to maintain glucose within target range  - Assess barriers to adequate nutritional intake and initiate nutrition consult as needed  - Instruct patient on self management of diabetes  11/28/2022 0210 by Kathy Nino RN  Outcome: Progressing  11/28/2022 0210 by Kathy Nino RN  Outcome: Progressing     Problem: PAIN - ADULT  Goal: Verbalizes/displays adequate comfort level or patient's stated pain goal  Description: INTERVENTIONS:  - Encourage pt to monitor pain and request assistance  - Assess pain using appropriate pain scale  - Administer analgesics based on type and severity of pain and evaluate response  - Implement non-pharmacological measures as appropriate and evaluate response  - Consider cultural and social influences on pain and pain management  - Manage/alleviate anxiety  - Utilize distraction and/or relaxation techniques  - Monitor for opioid side effects  - Notify MD/LIP if interventions unsuccessful or patient reports new pain  - Anticipate increased pain with activity and pre-medicate as appropriate  11/28/2022 0210 by Kathy Nino RN  Outcome: Progressing  11/28/2022 0210 by Kathy Nino RN  Outcome: Progressing     Problem: SAFETY ADULT - FALL  Goal: Free from fall injury  Description: INTERVENTIONS:  - Assess pt frequently for physical needs  - Identify cognitive and physical deficits and behaviors that affect risk of falls.   - [place] fall precautions as indicated by assessment.  - Educate pt/family on patient safety including physical limitations  - Instruct pt to call for assistance with activity based on assessment  - Modify environment to reduce risk of injury  - Provide assistive devices as appropriate  - Consider OT/PT consult to assist with strengthening/mobility  - Encourage toileting schedule  11/28/2022 0210 by Ema Guadalupe RN  Outcome: Progressing  11/28/2022 0210 by Ema Guadalupe, RN  Outcome: Progressing

## 2022-11-28 NOTE — PROGRESS NOTES
[facility] note: Duplicate Proton Pump Inhibitor with Histamine 2 blocker. Francisco Coffman is a 8 year old patient who has been prescribed both famotidine (Pepcid)  And pantoprazole (Protonix). Pepcid was discontinued per P&T approved protocol for duplicate therapy in adult patients for medications not ordered by gastroenterology.     Thank you,  Marianne Max, PharmD  11/28/2022

## 2022-11-28 NOTE — CM/SW NOTE
11/28/22 1600   CM/ Referral Data   Referral Source Physician   Reason for Referral Discharge planning   Informant Other  Tima Mona Charlese)   Pertinent Medical Hx   Does patient have an established PCP?   (Martir Smallwood)   Significant Past Medical/Mental Health Hx Fall hip fx   Patient Info   Patient's Current Mental Status at Time of Assessment Memory Impairments   Patient's [facility] Acute [facility] Provider Upon Admission [facility]   Patient Status Prior to Admission   Independent with ADLs and Mobility No   Pt. requires assistance with Housework;Driving;Meals; Bathing; Ambulating;Dressing;Medications; Toileting;Finances   Discharge Needs   Anticipated D/C needs Subacute rehab   Services Requested   PASRR Level 1 Submitted Yes  (Queued for review)   Choice of Post-Acute Provider   Informed patient of right to choose their preferred provider Yes   List of appropriate post-acute services provided to patient/family with quality data Yes  (Emailed to [email or web address])     OLU met with pt at bedside. She requested I call Thomas Adamson her Granddaughter. Call Thomas Adamson to discuss pts dc plan. Per Thomas Adamson pt lives in [place] [address] at [facility].  She requires assist with All ADLS. Pt is mostly WC bound at baseline. She does do sit to stand transfers in and out of her wheel chair. CM Discussed dc plan for [facility]. Per Thomas Adamson best person for rehab choice is her uncle/ pts son Ruiz Albert. CM attempted to call Ruiz Mannn, no answer left message. Returned call to Thomas Adamson. Emailed her the [facility] list she will call Ruiz Albert and share the list with him. Thomas Adamson aware  that rehab choice is needed to begin ins auth. PASRR queued for review. Plan: [facility] pending choice and ins auth.

## 2022-11-28 NOTE — PLAN OF CARE
Pt is A&Ox1-2. 2.5 L O2. SCDs and lovenox for dvt prophylaxis. Low urine output, MD notified. LR @83 started. Scheduled tylenol and prn tramadol for pain management. Up max assist. L elbow skin tear-mepilex. ACHS. Carb controlled diet. Transfusing 1 unit of PRBCs due to hbg level of 6.8. Plan for [facility] when medically clear. Problem: Patient Centered Care  Goal: Patient preferences are identified and integrated in the patient's plan of care  Description: Interventions:  - What would you like us to know as we care for you?  Feed me  - Provide timely, complete, and accurate information to patient/family  - Incorporate patient and family knowledge, values, beliefs, and cultural backgrounds into the planning and delivery of care  - Encourage patient/family to participate in care and decision-making at the level they choose  - Honor patient and family perspectives and choices  Outcome: Progressing     Problem: Patient/Family Goals  Goal: Patient/Family Long Term Goal  Description: Patient's Long Term Goal: To go home without pain    Interventions:  - Pain meds  - See additional Care Plan goals for specific interventions  Outcome: Progressing  Goal: Patient/Family Short Term Goal  Description: Patient's Short Term Goal: To go home    Interventions:   - Be cleared medically   - See additional Care Plan goals for specific interventions  Outcome: Progressing     Problem: Diabetes/Glucose Control  Goal: Glucose maintained within prescribed range  Description: INTERVENTIONS:  - Monitor Blood Glucose as ordered  - Assess for signs and symptoms of hyperglycemia and hypoglycemia  - Administer ordered medications to maintain glucose within target range  - Assess barriers to adequate nutritional intake and initiate nutrition consult as needed  - Instruct patient on self management of diabetes  Outcome: Progressing     Problem: PAIN - ADULT  Goal: Verbalizes/displays adequate comfort level or patient's stated pain goal  Description: INTERVENTIONS:  - Encourage pt to monitor pain and request assistance  - Assess pain using appropriate pain scale  - Administer analgesics based on type and severity of pain and evaluate response  - Implement non-pharmacological measures as appropriate and evaluate response  - Consider cultural and social influences on pain and pain management  - Manage/alleviate anxiety  - Utilize distraction and/or relaxation techniques  - Monitor for opioid side effects  - Notify MD/LIP if interventions unsuccessful or patient reports new pain  - Anticipate increased pain with activity and pre-medicate as appropriate  Outcome: Progressing     Problem: SAFETY ADULT - FALL  Goal: Free from fall injury  Description: INTERVENTIONS:  - Assess pt frequently for physical needs  - Identify cognitive and physical deficits and behaviors that affect risk of falls.   - [place] fall precautions as indicated by assessment.  - Educate pt/family on patient safety including physical limitations  - Instruct pt to call for assistance with activity based on assessment  - Modify environment to reduce risk of injury  - Provide assistive devices as appropriate  - Consider OT/PT consult to assist with strengthening/mobility  - Encourage toileting schedule  Outcome: Progressing

## 2022-11-29 ENCOUNTER — APPOINTMENT (OUTPATIENT)
Dept: GENERAL RADIOLOGY | Facility: HOSPITAL | Age: 87
DRG: 481 | End: 2022-11-29
Attending: NURSE PRACTITIONER
Payer: MEDICARE

## 2022-11-29 LAB
ANION GAP SERPL CALC-SCNC: 4 MMOL/L (ref 0–18)
BASOPHILS # BLD AUTO: 0.05 X10(3) UL (ref 0–0.2)
BASOPHILS NFR BLD AUTO: 0.5 %
BILIRUB UR QL: NEGATIVE
BLOOD TYPE BARCODE: 5100
BUN BLD-MCNC: 28 MG/DL (ref 7–18)
BUN/CREAT SERPL: 21.2 (ref 10–20)
CALCIUM BLD-MCNC: 7.8 MG/DL (ref 8.5–10.1)
CHLORIDE SERPL-SCNC: 108 MMOL/L (ref 98–112)
CO2 SERPL-SCNC: 23 MMOL/L (ref 21–32)
COLOR UR: YELLOW
CREAT BLD-MCNC: 1.32 MG/DL
DEPRECATED RDW RBC AUTO: 58.6 FL (ref 35.1–46.3)
EOSINOPHIL # BLD AUTO: 0.17 X10(3) UL (ref 0–0.7)
EOSINOPHIL NFR BLD AUTO: 1.6 %
ERYTHROCYTE [DISTWIDTH] IN BLOOD BY AUTOMATED COUNT: 19.8 % (ref 11–15)
GFR SERPLBLD BASED ON 1.73 SQ M-ARVRAT: 36 ML/MIN/1.73M2 (ref 60–?)
GLUCOSE BLD-MCNC: 159 MG/DL (ref 70–99)
GLUCOSE BLDC GLUCOMTR-MCNC: 152 MG/DL (ref 70–99)
GLUCOSE BLDC GLUCOMTR-MCNC: 167 MG/DL (ref 70–99)
GLUCOSE BLDC GLUCOMTR-MCNC: 187 MG/DL (ref 70–99)
GLUCOSE BLDC GLUCOMTR-MCNC: 199 MG/DL (ref 70–99)
GLUCOSE UR-MCNC: NEGATIVE MG/DL
HCT VFR BLD AUTO: 24.2 %
HCT VFR BLD AUTO: 24.6 %
HGB BLD-MCNC: 7.4 G/DL
HGB BLD-MCNC: 7.7 G/DL
IMM GRANULOCYTES # BLD AUTO: 0.1 X10(3) UL (ref 0–1)
IMM GRANULOCYTES NFR BLD: 1 %
KETONES UR-MCNC: 15 MG/DL
LYMPHOCYTES # BLD AUTO: 1.14 X10(3) UL (ref 1–4)
LYMPHOCYTES NFR BLD AUTO: 11 %
MCH RBC QN AUTO: 25 PG (ref 26–34)
MCHC RBC AUTO-ENTMCNC: 30.6 G/DL (ref 31–37)
MCV RBC AUTO: 81.8 FL
MONOCYTES # BLD AUTO: 1.13 X10(3) UL (ref 0.1–1)
MONOCYTES NFR BLD AUTO: 10.9 %
NEUTROPHILS # BLD AUTO: 7.79 X10 (3) UL (ref 1.5–7.7)
NEUTROPHILS # BLD AUTO: 7.79 X10(3) UL (ref 1.5–7.7)
NEUTROPHILS NFR BLD AUTO: 75 %
NITRITE UR QL STRIP.AUTO: NEGATIVE
NT-PROBNP SERPL-MCNC: ABNORMAL PG/ML (ref ?–450)
OSMOLALITY SERPL CALC.SUM OF ELEC: 289 MOSM/KG (ref 275–295)
PH UR: 5.5 [PH] (ref 5–8)
PLATELET # BLD AUTO: 232 10(3)UL (ref 150–450)
POTASSIUM SERPL-SCNC: 4.3 MMOL/L (ref 3.5–5.1)
RBC # BLD AUTO: 2.96 X10(6)UL
SODIUM SERPL-SCNC: 135 MMOL/L (ref 136–145)
SP GR UR STRIP: 1.02 (ref 1–1.03)
UROBILINOGEN UR STRIP-ACNC: 0.2
WBC # BLD AUTO: 10.4 X10(3) UL (ref 4–11)
WBC #/AREA URNS AUTO: >50 /HPF
WBC #/AREA URNS AUTO: >50 /HPF

## 2022-11-29 PROCEDURE — 81001 URINALYSIS AUTO W/SCOPE: CPT | Performed by: NURSE PRACTITIONER

## 2022-11-29 PROCEDURE — 81015 MICROSCOPIC EXAM OF URINE: CPT | Performed by: NURSE PRACTITIONER

## 2022-11-29 PROCEDURE — 83880 ASSAY OF NATRIURETIC PEPTIDE: CPT | Performed by: NURSE PRACTITIONER

## 2022-11-29 PROCEDURE — 97530 THERAPEUTIC ACTIVITIES: CPT

## 2022-11-29 PROCEDURE — 85018 HEMOGLOBIN: CPT | Performed by: NURSE PRACTITIONER

## 2022-11-29 PROCEDURE — 85014 HEMATOCRIT: CPT | Performed by: NURSE PRACTITIONER

## 2022-11-29 PROCEDURE — 71045 X-RAY EXAM CHEST 1 VIEW: CPT | Performed by: NURSE PRACTITIONER

## 2022-11-29 PROCEDURE — 97110 THERAPEUTIC EXERCISES: CPT

## 2022-11-29 PROCEDURE — 82962 GLUCOSE BLOOD TEST: CPT

## 2022-11-29 PROCEDURE — 80048 BASIC METABOLIC PNL TOTAL CA: CPT | Performed by: NURSE PRACTITIONER

## 2022-11-29 PROCEDURE — 85025 COMPLETE CBC W/AUTO DIFF WBC: CPT | Performed by: NURSE PRACTITIONER

## 2022-11-29 RX ORDER — SODIUM CHLORIDE, SODIUM LACTATE, POTASSIUM CHLORIDE, CALCIUM CHLORIDE 600; 310; 30; 20 MG/100ML; MG/100ML; MG/100ML; MG/100ML
INJECTION, SOLUTION INTRAVENOUS CONTINUOUS
Status: DISCONTINUED | OUTPATIENT
Start: 2022-11-29 | End: 2022-11-29

## 2022-11-29 RX ORDER — AMLODIPINE BESYLATE 5 MG/1
5 TABLET ORAL DAILY
Status: DISCONTINUED | OUTPATIENT
Start: 2022-11-30 | End: 2022-11-30

## 2022-11-29 RX ORDER — SODIUM CHLORIDE, SODIUM LACTATE, POTASSIUM CHLORIDE, CALCIUM CHLORIDE 600; 310; 30; 20 MG/100ML; MG/100ML; MG/100ML; MG/100ML
INJECTION, SOLUTION INTRAVENOUS CONTINUOUS
Status: DISCONTINUED | OUTPATIENT
Start: 2022-11-29 | End: 2022-11-30

## 2022-11-29 RX ORDER — HYDROCODONE BITARTRATE AND ACETAMINOPHEN 5; 325 MG/1; MG/1
1 TABLET ORAL EVERY 6 HOURS PRN
Status: DISCONTINUED | OUTPATIENT
Start: 2022-11-29 | End: 2022-12-05

## 2022-11-29 NOTE — PLAN OF CARE
Pt is A&Ox2-3. RA. SCDs and lovenox for dvt prophylaxis. Purewick in place. PRN tramadol and norco for pain management. Up max assist. Carb controlled diet. ACHS. LR @50. Mepilex to sacrum. L elbow skin tear-mepilex. Aquacel in place. Plan for [facility] care pending insurance auth. Problem: Patient Centered Care  Goal: Patient preferences are identified and integrated in the patient's plan of care  Description: Interventions:  - What would you like us to know as we care for you?  I had some visitors today  - Provide timely, complete, and accurate information to patient/family  - Incorporate patient and family knowledge, values, beliefs, and cultural backgrounds into the planning and delivery of care  - Encourage patient/family to participate in care and decision-making at the level they choose  - Honor patient and family perspectives and choices  Outcome: Progressing     Problem: Patient/Family Goals  Goal: Patient/Family Long Term Goal  Description: Patient's Long Term Goal: To go home without pain    Interventions:  - Pain meds   - See additional Care Plan goals for specific interventions  Outcome: Progressing  Goal: Patient/Family Short Term Goal  Description: Patient's Short Term Goal: To go home    Interventions:   - Be cleared medically   - See additional Care Plan goals for specific interventions  Outcome: Progressing     Problem: Diabetes/Glucose Control  Goal: Glucose maintained within prescribed range  Description: INTERVENTIONS:  - Monitor Blood Glucose as ordered  - Assess for signs and symptoms of hyperglycemia and hypoglycemia  - Administer ordered medications to maintain glucose within target range  - Assess barriers to adequate nutritional intake and initiate nutrition consult as needed  - Instruct patient on self management of diabetes  Outcome: Progressing     Problem: PAIN - ADULT  Goal: Verbalizes/displays adequate comfort level or patient's stated pain goal  Description: INTERVENTIONS:  - Encourage pt to monitor pain and request assistance  - Assess pain using appropriate pain scale  - Administer analgesics based on type and severity of pain and evaluate response  - Implement non-pharmacological measures as appropriate and evaluate response  - Consider cultural and social influences on pain and pain management  - Manage/alleviate anxiety  - Utilize distraction and/or relaxation techniques  - Monitor for opioid side effects  - Notify MD/LIP if interventions unsuccessful or patient reports new pain  - Anticipate increased pain with activity and pre-medicate as appropriate  Outcome: Progressing     Problem: SAFETY ADULT - FALL  Goal: Free from fall injury  Description: INTERVENTIONS:  - Assess pt frequently for physical needs  - Identify cognitive and physical deficits and behaviors that affect risk of falls.   - [place] fall precautions as indicated by assessment.  - Educate pt/family on patient safety including physical limitations  - Instruct pt to call for assistance with activity based on assessment  - Modify environment to reduce risk of injury  - Provide assistive devices as appropriate  - Consider OT/PT consult to assist with strengthening/mobility  - Encourage toileting schedule  Outcome: Progressing

## 2022-11-29 NOTE — PHYSICAL THERAPY NOTE
PHYSICAL THERAPY TREATMENT NOTE - INPATIENT     Room Number: 405/405-A       Presenting Problem: Fall resulting in closed fracture of L hip requiring IM nail fixation on 22       Problem List  Principal Problem:    Closed fracture of left hip, initial encounter ([address]      PHYSICAL THERAPY ASSESSMENT     Pt seen daily;chart reviewed. Per RN request,PT  RX limited to bed mobility,positioning for pt comfort as well as ther ex. Pt educated on deep breathing. Family present;family education;all questions and concerns addressed. Pt ended session supine in bed;call light within reach. RN aware. .  The patient's Approx Degree of Impairment: 86.62% has been calculated based on documentation in the [facility] '6 clicks' Inpatient Basic Mobility Short Form. Research supports that patients with this level of impairment may benefit from Sub-acute Rehabilitation. Gabi Neal DISCHARGE RECOMMENDATIONS  PT Discharge Recommendations: Sub-acute rehabilitation     PLAN  PT Treatment Plan: Patient education; Endurance; Body mechanics;Strengthening  Frequency (Obs): Daily    SUBJECTIVE  Limited participation. OBJECTIVE  Precautions: Hard of hearing;Limb alert - left    WEIGHT BEARING RESTRICTION           L Lower Extremity: Weight Bearing as Tolerated    PAIN ASSESSMENT   Ratin  Location: LLE with activity  Management Techniques: Activity promotion; Body mechanics;Repositioning    BALANCE  Static Sitting: Poor  Dynamic Sitting: Poor -  Static Standing: Not tested  Dynamic Standing: Not tested    ACTIVITY TOLERANCE                          O2 WALK       AM-PAC '6-Clicks' INPATIENT SHORT FORM - BASIC MOBILITY  How much difficulty does the patient currently have. ..   Patient Difficulty: Turning over in bed (including adjusting bedclothes, sheets and blankets)?: A Lot   Patient Difficulty: Sitting down on and standing up from a chair with arms (e.g., wheelchair, bedside commode, etc.): Unable   Patient Difficulty: Moving from lying on back to sitting on the side of the bed?: A Lot   How much help from another person does the patient currently need. .. Help from Another: Moving to and from a bed to a chair (including a wheelchair)?: Total   Help from Another: Need to walk in hospital room?: Total   Help from Another: Climbing 3-5 steps with a railing?: Total     AM-PAC Score:  Raw Score: 8   Approx Degree of Impairment: 86.62%   Standardized Score (AM-PAC Scale): 28.58   CMS Modifier (G-Code): CM    FUNCTIONAL ABILITY STATUS  Functional Mobility/Gait Assessment  Gait Assistance: Not tested    Additional information:     THERAPEUTIC EXERCISES  Lower Extremity Ankle pumps  Hip AB/AD  Heel slides  Quad sets     Position Supine       Patient End of Session: In bed;Call light within reach;RN aware of session/findings;Bracing education provided per handout; All patient questions and concerns addressed    CURRENT GOALS     Patient Goal Patient's self-stated goal is: return to PLOF   Goal #1 Patient is able to demonstrate supine - sit EOB @ level: moderate assistance     Goal #1   Current Status Max a   Goal #2 Patient will tolerate sitting EOB for 10 minutes requiring SBA to maintain balance to improve sitting tolerance and prepare for future transfers   Goal #2  Current Status  NT    Goal #3 Patient is able to demonstrate transfers EOB to/from Chair/Wheelchair at assistance level: maximum assistance with walker - rolling     Goal #3   Current Status NT   Goal #4 Patient to demonstrate independence with home activity/exercise instructions provided to patient in preparation for discharge.    Goal #4   Current Status In progress No

## 2022-11-29 NOTE — CM/SW NOTE
Received notice from NP Margaretta Runner - pt's son Misael White confirmed he is [facility]. and will be the contact for further DC planning and updates. Per NP, pt's family has chosen [facility] for [facility] at time of DC. [address] reserved via [employer]. SW requested [phone] submit insurance approval via [employer] web portal.    PASRR resulted as [facility] FAC[facility] Discharge. Document uploaded to [address]. Need insurance authorization prior to d/c. PLAN: [facility] - pending ins auth & med clear      SW/CM to remain available for support and/or discharge planning.          Amanda Terrell, MSW, [address]

## 2022-11-30 LAB
ANION GAP SERPL CALC-SCNC: 5 MMOL/L (ref 0–18)
BASOPHILS # BLD AUTO: 0.06 X10(3) UL (ref 0–0.2)
BASOPHILS NFR BLD AUTO: 0.5 %
BUN BLD-MCNC: 28 MG/DL (ref 7–18)
BUN/CREAT SERPL: 27.2 (ref 10–20)
CALCIUM BLD-MCNC: 7.8 MG/DL (ref 8.5–10.1)
CHLORIDE SERPL-SCNC: 106 MMOL/L (ref 98–112)
CO2 SERPL-SCNC: 24 MMOL/L (ref 21–32)
CREAT BLD-MCNC: 1.03 MG/DL
DEPRECATED RDW RBC AUTO: 60.3 FL (ref 35.1–46.3)
EOSINOPHIL # BLD AUTO: 0.35 X10(3) UL (ref 0–0.7)
EOSINOPHIL NFR BLD AUTO: 3.1 %
ERYTHROCYTE [DISTWIDTH] IN BLOOD BY AUTOMATED COUNT: 20.8 % (ref 11–15)
GFR SERPLBLD BASED ON 1.73 SQ M-ARVRAT: 48 ML/MIN/1.73M2 (ref 60–?)
GLUCOSE BLD-MCNC: 122 MG/DL (ref 70–99)
GLUCOSE BLDC GLUCOMTR-MCNC: 143 MG/DL (ref 70–99)
GLUCOSE BLDC GLUCOMTR-MCNC: 152 MG/DL (ref 70–99)
GLUCOSE BLDC GLUCOMTR-MCNC: 157 MG/DL (ref 70–99)
GLUCOSE BLDC GLUCOMTR-MCNC: 181 MG/DL (ref 70–99)
HCT VFR BLD AUTO: 24.3 %
HGB BLD-MCNC: 7.5 G/DL
IMM GRANULOCYTES # BLD AUTO: 0.1 X10(3) UL (ref 0–1)
IMM GRANULOCYTES NFR BLD: 0.9 %
IRON SATN MFR SERPL: 6 %
IRON SERPL-MCNC: 13 UG/DL
LYMPHOCYTES # BLD AUTO: 0.92 X10(3) UL (ref 1–4)
LYMPHOCYTES NFR BLD AUTO: 8.2 %
MCH RBC QN AUTO: 25.2 PG (ref 26–34)
MCHC RBC AUTO-ENTMCNC: 30.9 G/DL (ref 31–37)
MCV RBC AUTO: 81.5 FL
MONOCYTES # BLD AUTO: 0.93 X10(3) UL (ref 0.1–1)
MONOCYTES NFR BLD AUTO: 8.3 %
NEUTROPHILS # BLD AUTO: 8.83 X10 (3) UL (ref 1.5–7.7)
NEUTROPHILS # BLD AUTO: 8.83 X10(3) UL (ref 1.5–7.7)
NEUTROPHILS NFR BLD AUTO: 79 %
OSMOLALITY SERPL CALC.SUM OF ELEC: 287 MOSM/KG (ref 275–295)
PLATELET # BLD AUTO: 240 10(3)UL (ref 150–450)
POTASSIUM SERPL-SCNC: 4.1 MMOL/L (ref 3.5–5.1)
RBC # BLD AUTO: 2.98 X10(6)UL
SODIUM SERPL-SCNC: 135 MMOL/L (ref 136–145)
TIBC SERPL-MCNC: 234 UG/DL (ref 240–450)
TRANSFERRIN SERPL-MCNC: 157 MG/DL (ref 200–360)
WBC # BLD AUTO: 11.2 X10(3) UL (ref 4–11)

## 2022-11-30 PROCEDURE — 85025 COMPLETE CBC W/AUTO DIFF WBC: CPT | Performed by: NURSE PRACTITIONER

## 2022-11-30 PROCEDURE — 80048 BASIC METABOLIC PNL TOTAL CA: CPT | Performed by: NURSE PRACTITIONER

## 2022-11-30 PROCEDURE — 82962 GLUCOSE BLOOD TEST: CPT

## 2022-11-30 PROCEDURE — 84466 ASSAY OF TRANSFERRIN: CPT | Performed by: HOSPITALIST

## 2022-11-30 PROCEDURE — 83540 ASSAY OF IRON: CPT | Performed by: HOSPITALIST

## 2022-11-30 PROCEDURE — 97110 THERAPEUTIC EXERCISES: CPT

## 2022-11-30 PROCEDURE — 97530 THERAPEUTIC ACTIVITIES: CPT

## 2022-11-30 RX ORDER — FUROSEMIDE 20 MG/1
20 TABLET ORAL DAILY
Status: DISCONTINUED | OUTPATIENT
Start: 2022-11-30 | End: 2022-12-05

## 2022-11-30 RX ORDER — MELATONIN
3 NIGHTLY
Status: DISCONTINUED | OUTPATIENT
Start: 2022-11-30 | End: 2022-12-05

## 2022-11-30 RX ORDER — DOXEPIN HYDROCHLORIDE 50 MG/1
1 CAPSULE ORAL DAILY
Refills: 0 | Status: SHIPPED | COMMUNITY
Start: 2022-12-01

## 2022-11-30 RX ORDER — ENOXAPARIN SODIUM 100 MG/ML
40 INJECTION SUBCUTANEOUS EVERY 24 HOURS
Refills: 0 | Status: SHIPPED | COMMUNITY
Start: 2022-12-01

## 2022-11-30 RX ORDER — PSEUDOEPHEDRINE HCL 30 MG
100 TABLET ORAL 2 TIMES DAILY
Refills: 0 | Status: SHIPPED | COMMUNITY
Start: 2022-11-30

## 2022-11-30 RX ORDER — HYDROCODONE BITARTRATE AND ACETAMINOPHEN 5; 325 MG/1; MG/1
1 TABLET ORAL EVERY 6 HOURS PRN
Qty: 5 TABLET | Refills: 0 | Status: SHIPPED | OUTPATIENT
Start: 2022-11-30

## 2022-11-30 NOTE — PLAN OF CARE
Problem: Patient Centered Care  Goal: Patient preferences are identified and integrated in the patient's plan of care  Description: Interventions:  - What would you like us to know as we care for you?   - Provide timely, complete, and accurate information to patient/family  - Incorporate patient and family knowledge, values, beliefs, and cultural backgrounds into the planning and delivery of care  - Encourage patient/family to participate in care and decision-making at the level they choose  - Honor patient and family perspectives and choices  Outcome: Progressing     Problem: Diabetes/Glucose Control  Goal: Glucose maintained within prescribed range  Description: INTERVENTIONS:  - Monitor Blood Glucose as ordered  - Assess for signs and symptoms of hyperglycemia and hypoglycemia  - Administer ordered medications to maintain glucose within target range  - Assess barriers to adequate nutritional intake and initiate nutrition consult as needed  - Instruct patient on self management of diabetes  Outcome: Progressing     Problem: PAIN - ADULT  Goal: Verbalizes/displays adequate comfort level or patient's stated pain goal  Description: INTERVENTIONS:  - Encourage pt to monitor pain and request assistance  - Assess pain using appropriate pain scale  - Administer analgesics based on type and severity of pain and evaluate response  - Implement non-pharmacological measures as appropriate and evaluate response  - Consider cultural and social influences on pain and pain management  - Manage/alleviate anxiety  - Utilize distraction and/or relaxation techniques  - Monitor for opioid side effects  - Notify MD/LIP if interventions unsuccessful or patient reports new pain  - Anticipate increased pain with activity and pre-medicate as appropriate  Outcome: Progressing     Problem: SAFETY ADULT - FALL  Goal: Free from fall injury  Description: INTERVENTIONS:  - Assess pt frequently for physical needs  - Identify cognitive and physical deficits and behaviors that affect risk of falls. - [place] fall precautions as indicated by assessment.  - Educate pt/family on patient safety including physical limitations  - Instruct pt to call for assistance with activity based on assessment  - Modify environment to reduce risk of injury  - Provide assistive devices as appropriate  - Consider OT/PT consult to assist with strengthening/mobility  - Encourage toileting schedule  Outcome: Progressing    Patient is a max assist, WBAT. Pain managed with scheduled tylenol, and PRN norco and ice pack. SCDs and lovenox for DVT prophylaxis. Patient voiding with purewick. Surgical dressing dry and intact. No acute changes. Vitals signs as charted. Patient in lowest position, bed alarm on, call light within reach, using appropriately. Plan for discharge, once medically cleared is [facility].

## 2022-11-30 NOTE — DISCHARGE INSTRUCTIONS
Repeat cbc in 1 week, sooner if needed    Stop lovenox on 12/27    Make appointment to see Dr Estrellita Goldsmith in 2 weeks

## 2022-11-30 NOTE — PLAN OF CARE
Patient is a&ox1, has been resting in bed w/ call light within reach. Is a max assist. Currently states she has mild pain. Is tolerating diet, is AC&HS. Is voiding using purewick. Is on Lovenox and SCDs for dvt prophylaxis. DC plan pending auth. Problem: Patient Centered Care  Goal: Patient preferences are identified and integrated in the patient's plan of care  Description: Interventions:  - What would you like us to know as we care for you?  I like all kinds of foods   - Provide timely, complete, and accurate information to patient/family  - Incorporate patient and family knowledge, values, beliefs, and cultural backgrounds into the planning and delivery of care  - Encourage patient/family to participate in care and decision-making at the level they choose  - Honor patient and family perspectives and choices  Outcome: Progressing     Problem: Patient/Family Goals  Goal: Patient/Family Long Term Goal  Description: Patient's Long Term Goal: to be able to ambulate w/ minimal assistance     Interventions:  - rehab  - ambulation w/walker  - pain medications   - See additional Care Plan goals for specific interventions  Outcome: Progressing  Goal: Patient/Family Short Term Goal  Description: Patient's Short Term Goal: to go home     Interventions:   - follow plan of care  - See additional Care Plan goals for specific interventions  Outcome: Progressing     Problem: Diabetes/Glucose Control  Goal: Glucose maintained within prescribed range  Description: INTERVENTIONS:  - Monitor Blood Glucose as ordered  - Assess for signs and symptoms of hyperglycemia and hypoglycemia  - Administer ordered medications to maintain glucose within target range  - Assess barriers to adequate nutritional intake and initiate nutrition consult as needed  - Instruct patient on self management of diabetes  Outcome: Progressing     Problem: PAIN - ADULT  Goal: Verbalizes/displays adequate comfort level or patient's stated pain goal  Description: INTERVENTIONS:  - Encourage pt to monitor pain and request assistance  - Assess pain using appropriate pain scale  - Administer analgesics based on type and severity of pain and evaluate response  - Implement non-pharmacological measures as appropriate and evaluate response  - Consider cultural and social influences on pain and pain management  - Manage/alleviate anxiety  - Utilize distraction and/or relaxation techniques  - Monitor for opioid side effects  - Notify MD/LIP if interventions unsuccessful or patient reports new pain  - Anticipate increased pain with activity and pre-medicate as appropriate  Outcome: Progressing     Problem: SAFETY ADULT - FALL  Goal: Free from fall injury  Description: INTERVENTIONS:  - Assess pt frequently for physical needs  - Identify cognitive and physical deficits and behaviors that affect risk of falls.   - [place] fall precautions as indicated by assessment.  - Educate pt/family on patient safety including physical limitations  - Instruct pt to call for assistance with activity based on assessment  - Modify environment to reduce risk of injury  - Provide assistive devices as appropriate  - Consider OT/PT consult to assist with strengthening/mobility  - Encourage toileting schedule  Outcome: Progressing

## 2022-12-01 LAB
ANION GAP SERPL CALC-SCNC: 5 MMOL/L (ref 0–18)
BASOPHILS # BLD AUTO: 0.07 X10(3) UL (ref 0–0.2)
BASOPHILS NFR BLD AUTO: 0.7 %
BUN BLD-MCNC: 22 MG/DL (ref 7–18)
BUN/CREAT SERPL: 27.5 (ref 10–20)
CALCIUM BLD-MCNC: 7.8 MG/DL (ref 8.5–10.1)
CHLORIDE SERPL-SCNC: 105 MMOL/L (ref 98–112)
CO2 SERPL-SCNC: 25 MMOL/L (ref 21–32)
CREAT BLD-MCNC: 0.8 MG/DL
DEPRECATED RDW RBC AUTO: 62 FL (ref 35.1–46.3)
EOSINOPHIL # BLD AUTO: 0.29 X10(3) UL (ref 0–0.7)
EOSINOPHIL NFR BLD AUTO: 3 %
ERYTHROCYTE [DISTWIDTH] IN BLOOD BY AUTOMATED COUNT: 21.5 % (ref 11–15)
GFR SERPLBLD BASED ON 1.73 SQ M-ARVRAT: 65 ML/MIN/1.73M2 (ref 60–?)
GLUCOSE BLD-MCNC: 103 MG/DL (ref 70–99)
GLUCOSE BLDC GLUCOMTR-MCNC: 119 MG/DL (ref 70–99)
GLUCOSE BLDC GLUCOMTR-MCNC: 123 MG/DL (ref 70–99)
GLUCOSE BLDC GLUCOMTR-MCNC: 124 MG/DL (ref 70–99)
GLUCOSE BLDC GLUCOMTR-MCNC: 128 MG/DL (ref 70–99)
HCT VFR BLD AUTO: 25.3 %
HGB BLD-MCNC: 8 G/DL
IMM GRANULOCYTES # BLD AUTO: 0.17 X10(3) UL (ref 0–1)
IMM GRANULOCYTES NFR BLD: 1.8 %
LYMPHOCYTES # BLD AUTO: 0.72 X10(3) UL (ref 1–4)
LYMPHOCYTES NFR BLD AUTO: 7.5 %
MCH RBC QN AUTO: 25.7 PG (ref 26–34)
MCHC RBC AUTO-ENTMCNC: 31.6 G/DL (ref 31–37)
MCV RBC AUTO: 81.4 FL
MONOCYTES # BLD AUTO: 0.94 X10(3) UL (ref 0.1–1)
MONOCYTES NFR BLD AUTO: 9.8 %
NEUTROPHILS # BLD AUTO: 7.4 X10 (3) UL (ref 1.5–7.7)
NEUTROPHILS # BLD AUTO: 7.4 X10(3) UL (ref 1.5–7.7)
NEUTROPHILS NFR BLD AUTO: 77.2 %
OSMOLALITY SERPL CALC.SUM OF ELEC: 284 MOSM/KG (ref 275–295)
PLATELET # BLD AUTO: 266 10(3)UL (ref 150–450)
POTASSIUM SERPL-SCNC: 3.7 MMOL/L (ref 3.5–5.1)
RBC # BLD AUTO: 3.11 X10(6)UL
SODIUM SERPL-SCNC: 135 MMOL/L (ref 136–145)
WBC # BLD AUTO: 9.6 X10(3) UL (ref 4–11)

## 2022-12-01 PROCEDURE — 85025 COMPLETE CBC W/AUTO DIFF WBC: CPT | Performed by: NURSE PRACTITIONER

## 2022-12-01 PROCEDURE — 97530 THERAPEUTIC ACTIVITIES: CPT

## 2022-12-01 PROCEDURE — 97110 THERAPEUTIC EXERCISES: CPT

## 2022-12-01 PROCEDURE — 82962 GLUCOSE BLOOD TEST: CPT

## 2022-12-01 PROCEDURE — 80048 BASIC METABOLIC PNL TOTAL CA: CPT | Performed by: NURSE PRACTITIONER

## 2022-12-01 NOTE — PHYSICAL THERAPY NOTE
PHYSICAL THERAPY TREATMENT NOTE - INPATIENT     Room Number: 405/405-A       Presenting Problem: Fall resulting in closed fracture of L hip requiring IM nail fixation on 22    Problem List  Principal Problem:    Closed fracture of left hip, initial encounter ([address]      PHYSICAL THERAPY ASSESSMENT   Chart reviewed. Per  RN Dawn  Request,PT RX limited to bed mobility,positioniong for pt comfort as well as there ex. Pt educated on deep breathing as well as energy conservation technique. Extra time provided to complete tasks. PPE worn by therapist: mask, gloves and goggles. Patient was wearing a mask during session. Patient presented in bed with 6/10 pain. Patient with good  progress towards goals during this session. Education provided on Physical therapy plan of care and physiological benefits of out of bed mobility. Patient with good carryover. Bed mobility: Max assist  Transfers: NT  Gait Assistance: Not tested                   Pt seen daily. Patient was left in bed at end of session with all needs in reach. The patient's Approx Degree of Impairment: 86.62% has been calculated based on documentation in the [facility] '6 clicks' Inpatient Basic Mobility Short Form. Research supports that patients with this level of impairment may benefit from Subacute Rehab. RN aware of patient status post session. DISCHARGE RECOMMENDATIONS  PT Discharge Recommendations: Sub-acute rehabilitation     PLAN  PT Treatment Plan: Bed mobility; Endurance; Patient education;Gait training    SUBJECTIVE  Limited participation    OBJECTIVE  Precautions: Hard of hearing;Limb alert - left    WEIGHT BEARING RESTRICTION  Weight Bearing Restriction: L lower extremity           L Lower Extremity: Weight Bearing as Tolerated    PAIN ASSESSMENT   Ratin  Location: LLE with activity  Management Techniques: Activity promotion; Body mechanics;Repositioning    BALANCE Static Sitting: Poor  Dynamic Sitting: Poor -           Static Standing: Not tested  Dynamic Standing: Not tested    ACTIVITY TOLERANCE                         O2 WALK       AM-PAC '6-Clicks' INPATIENT SHORT FORM - BASIC MOBILITY  How much difficulty does the patient currently have. .. Patient Difficulty: Turning over in bed (including adjusting bedclothes, sheets and blankets)?: A Lot   Patient Difficulty: Sitting down on and standing up from a chair with arms (e.g., wheelchair, bedside commode, etc.): Unable   Patient Difficulty: Moving from lying on back to sitting on the side of the bed?: A Lot   How much help from another person does the patient currently need. .. Help from Another: Moving to and from a bed to a chair (including a wheelchair)?: Total   Help from Another: Need to walk in hospital room?: Total   Help from Another: Climbing 3-5 steps with a railing?: Total     AM-PAC Score:  Raw Score: 8   Approx Degree of Impairment: 86.62%   Standardized Score (AM-PAC Scale): 28.58   CMS Modifier (G-Code): CM      Additional information:     THERAPEUTIC EXERCISES  Lower Extremity Ankle pumps  Glut sets  Quad sets     Position Supine       Patient End of Session: In bed;Call light within reach;RN aware of session/findings;Bracing education provided per handout; All patient questions and concerns addressed    CURRENT GOALS     Patient Goal Patient's self-stated goal is: return to PLOF   Goal #1 Patient is able to demonstrate supine - sit EOB @ level: moderate assistance     Goal #1   Current Status Max a   Goal #2 Patient will tolerate sitting EOB for 10 minutes requiring SBA to maintain balance to improve sitting tolerance and prepare for future transfers   Goal #2  Current Status  NT    Goal #3 Patient is able to demonstrate transfers EOB to/from Chair/Wheelchair at assistance level: maximum assistance with walker - rolling     Goal #3   Current Status NT   Goal #4 Patient to demonstrate independence with home activity/exercise instructions provided to patient in preparation for discharge.    Goal #4   Current Status In progress

## 2022-12-01 NOTE — PLAN OF CARE
Patient is a&ox1, has been resting in bed w/ call light within reach. Is a max assist. One to one feed. Is tolerating diet, is AC&HS. Is voiding using purewick. Is on Lovenox and SCDs for dvt prophylaxis. DC plan pending auth for rehab.  Bed in lowest position and call light within reach   Problem: Patient Centered Care  Goal: Patient preferences are identified and integrated in the patient's plan of care  Description: Interventions:  - What would you like us to know as we care for you?   - Provide timely, complete, and accurate information to patient/family  - Incorporate patient and family knowledge, values, beliefs, and cultural backgrounds into the planning and delivery of care  - Encourage patient/family to participate in care and decision-making at the level they choose  - Honor patient and family perspectives and choices  Outcome: Progressing     Problem: Patient/Family Goals  Goal: Patient/Family Long Term Goal  Description: Patient's Long Term Goal:     Interventions:  -   - See additional Care Plan goals for specific interventions  Outcome: Progressing  Goal: Patient/Family Short Term Goal  Description: Patient's Short Term Goal:     Interventions:   -   - See additional Care Plan goals for specific interventions  Outcome: Progressing     Problem: Diabetes/Glucose Control  Goal: Glucose maintained within prescribed range  Description: INTERVENTIONS:  - Monitor Blood Glucose as ordered  - Assess for signs and symptoms of hyperglycemia and hypoglycemia  - Administer ordered medications to maintain glucose within target range  - Assess barriers to adequate nutritional intake and initiate nutrition consult as needed  - Instruct patient on self management of diabetes  Outcome: Progressing     Problem: PAIN - ADULT  Goal: Verbalizes/displays adequate comfort level or patient's stated pain goal  Description: INTERVENTIONS:  - Encourage pt to monitor pain and request assistance  - Assess pain using appropriate pain scale  - Administer analgesics based on type and severity of pain and evaluate response  - Implement non-pharmacological measures as appropriate and evaluate response  - Consider cultural and social influences on pain and pain management  - Manage/alleviate anxiety  - Utilize distraction and/or relaxation techniques  - Monitor for opioid side effects  - Notify MD/LIP if interventions unsuccessful or patient reports new pain  - Anticipate increased pain with activity and pre-medicate as appropriate  Outcome: Progressing     Problem: SAFETY ADULT - FALL  Goal: Free from fall injury  Description: INTERVENTIONS:  - Assess pt frequently for physical needs  - Identify cognitive and physical deficits and behaviors that affect risk of falls.   - [place] fall precautions as indicated by assessment.  - Educate pt/family on patient safety including physical limitations  - Instruct pt to call for assistance with activity based on assessment  - Modify environment to reduce risk of injury  - Provide assistive devices as appropriate  - Consider OT/PT consult to assist with strengthening/mobility  - Encourage toileting schedule  Outcome: Progressing Declined

## 2022-12-02 LAB
GLUCOSE BLDC GLUCOMTR-MCNC: 121 MG/DL (ref 70–99)
GLUCOSE BLDC GLUCOMTR-MCNC: 122 MG/DL (ref 70–99)
GLUCOSE BLDC GLUCOMTR-MCNC: 134 MG/DL (ref 70–99)
GLUCOSE BLDC GLUCOMTR-MCNC: 148 MG/DL (ref 70–99)

## 2022-12-02 PROCEDURE — 82962 GLUCOSE BLOOD TEST: CPT

## 2022-12-02 NOTE — PLAN OF CARE
Patient is a&ox1, has been resting in bed w/ call light within reach. Is a max assist. One to one feed. Is tolerating diet, is AC&HS. Is voiding using purewick. Is on Lovenox and SCDs for dvt prophylaxis. Feet elevated with rolled blankets. Patient shifting as tolerated for pain relief. Patient very pleasant and follows commands well. DC plan pending auth for rehab. Bed in lowest position and call light within reach.   Problem: Patient Centered Care  Goal: Patient preferences are identified and integrated in the patient's plan of care  Description: Interventions:  - What would you like us to know as we care for you?   - Provide timely, complete, and accurate information to patient/family  - Incorporate patient and family knowledge, values, beliefs, and cultural backgrounds into the planning and delivery of care  - Encourage patient/family to participate in care and decision-making at the level they choose  - Honor patient and family perspectives and choices  Outcome: Progressing     Problem: Patient/Family Goals  Goal: Patient/Family Long Term Goal  Description: Patient's Long Term Goal:     Interventions:  -   - See additional Care Plan goals for specific interventions  Outcome: Progressing  Goal: Patient/Family Short Term Goal  Description: Patient's Short Term Goal:     Interventions:   -   - See additional Care Plan goals for specific interventions  Outcome: Progressing     Problem: Diabetes/Glucose Control  Goal: Glucose maintained within prescribed range  Description: INTERVENTIONS:  - Monitor Blood Glucose as ordered  - Assess for signs and symptoms of hyperglycemia and hypoglycemia  - Administer ordered medications to maintain glucose within target range  - Assess barriers to adequate nutritional intake and initiate nutrition consult as needed  - Instruct patient on self management of diabetes  Outcome: Progressing     Problem: PAIN - ADULT  Goal: Verbalizes/displays adequate comfort level or patient's stated pain goal  Description: INTERVENTIONS:  - Encourage pt to monitor pain and request assistance  - Assess pain using appropriate pain scale  - Administer analgesics based on type and severity of pain and evaluate response  - Implement non-pharmacological measures as appropriate and evaluate response  - Consider cultural and social influences on pain and pain management  - Manage/alleviate anxiety  - Utilize distraction and/or relaxation techniques  - Monitor for opioid side effects  - Notify MD/LIP if interventions unsuccessful or patient reports new pain  - Anticipate increased pain with activity and pre-medicate as appropriate  Outcome: Progressing     Problem: SAFETY ADULT - FALL  Goal: Free from fall injury  Description: INTERVENTIONS:  - Assess pt frequently for physical needs  - Identify cognitive and physical deficits and behaviors that affect risk of falls.   - [place] fall precautions as indicated by assessment.  - Educate pt/family on patient safety including physical limitations  - Instruct pt to call for assistance with activity based on assessment  - Modify environment to reduce risk of injury  - Provide assistive devices as appropriate  - Consider OT/PT consult to assist with strengthening/mobility  - Encourage toileting schedule  Outcome: Progressing

## 2022-12-02 NOTE — PLAN OF CARE
Problem: Patient Centered Care  Goal: Patient preferences are identified and integrated in the patient's plan of care  Description: Interventions:  - What would you like us to know as we care for you?   - Provide timely, complete, and accurate information to patient/family  - Incorporate patient and family knowledge, values, beliefs, and cultural backgrounds into the planning and delivery of care  - Encourage patient/family to participate in care and decision-making at the level they choose  - Honor patient and family perspectives and choices  Outcome: Progressing     Problem: Diabetes/Glucose Control  Goal: Glucose maintained within prescribed range  Description: INTERVENTIONS:  - Monitor Blood Glucose as ordered  - Assess for signs and symptoms of hyperglycemia and hypoglycemia  - Administer ordered medications to maintain glucose within target range  - Assess barriers to adequate nutritional intake and initiate nutrition consult as needed  - Instruct patient on self management of diabetes  Outcome: Progressing     Problem: PAIN - ADULT  Goal: Verbalizes/displays adequate comfort level or patient's stated pain goal  Description: INTERVENTIONS:  - Encourage pt to monitor pain and request assistance  - Assess pain using appropriate pain scale  - Administer analgesics based on type and severity of pain and evaluate response  - Implement non-pharmacological measures as appropriate and evaluate response  - Consider cultural and social influences on pain and pain management  - Manage/alleviate anxiety  - Utilize distraction and/or relaxation techniques  - Monitor for opioid side effects  - Notify MD/LIP if interventions unsuccessful or patient reports new pain  - Anticipate increased pain with activity and pre-medicate as appropriate  Outcome: Progressing     Problem: SAFETY ADULT - FALL  Goal: Free from fall injury  Description: INTERVENTIONS:  - Assess pt frequently for physical needs  - Identify cognitive and physical deficits and behaviors that affect risk of falls. - [place] fall precautions as indicated by assessment.  - Educate pt/family on patient safety including physical limitations  - Instruct pt to call for assistance with activity based on assessment  - Modify environment to reduce risk of injury  - Provide assistive devices as appropriate  - Consider OT/PT consult to assist with strengthening/mobility  - Encourage toileting schedule  Outcome: Progressing    Patient is alert and oriented x1-2. Is a max assist. Pain managed with scheduled tylenol and PRN norco. SCDs and lovenox for DVT prophylaxis. Patient voiding with purewick. Surgical dressing dry and intact. No acute changes. Vitals signs as charted. Patient in lowest position, bed alarm on, call light within reach, using appropriately. [facility]

## 2022-12-02 NOTE — PHYSICAL THERAPY NOTE
Attempted treatment pt declined therapy today. Educated pt and still declined. Will follow up as appropriate. RN aware.

## 2022-12-02 NOTE — CM/SW NOTE
CM f/u with [facility] on appeal re: SNF    Per Melvina Branch at [facility] appeal is still pending determination. Per Melvina Branch appeal can take 72 hours for response. CM/SW to remain available for support and/or discharge planning.     Ann Marie Mayorga RN, [facility]    Ext.  [phone]

## 2022-12-03 LAB
ANION GAP SERPL CALC-SCNC: 5 MMOL/L (ref 0–18)
BUN BLD-MCNC: 21 MG/DL (ref 7–18)
BUN/CREAT SERPL: 26.6 (ref 10–20)
CALCIUM BLD-MCNC: 8 MG/DL (ref 8.5–10.1)
CHLORIDE SERPL-SCNC: 105 MMOL/L (ref 98–112)
CO2 SERPL-SCNC: 27 MMOL/L (ref 21–32)
CREAT BLD-MCNC: 0.79 MG/DL
GFR SERPLBLD BASED ON 1.73 SQ M-ARVRAT: 66 ML/MIN/1.73M2 (ref 60–?)
GLUCOSE BLD-MCNC: 100 MG/DL (ref 70–99)
GLUCOSE BLDC GLUCOMTR-MCNC: 115 MG/DL (ref 70–99)
GLUCOSE BLDC GLUCOMTR-MCNC: 127 MG/DL (ref 70–99)
GLUCOSE BLDC GLUCOMTR-MCNC: 130 MG/DL (ref 70–99)
GLUCOSE BLDC GLUCOMTR-MCNC: 138 MG/DL (ref 70–99)
HGB BLD-MCNC: 8.1 G/DL
OSMOLALITY SERPL CALC.SUM OF ELEC: 287 MOSM/KG (ref 275–295)
POTASSIUM SERPL-SCNC: 3.6 MMOL/L (ref 3.5–5.1)
SODIUM SERPL-SCNC: 137 MMOL/L (ref 136–145)

## 2022-12-03 PROCEDURE — 82962 GLUCOSE BLOOD TEST: CPT

## 2022-12-03 PROCEDURE — 85018 HEMOGLOBIN: CPT | Performed by: HOSPITALIST

## 2022-12-03 PROCEDURE — 97530 THERAPEUTIC ACTIVITIES: CPT

## 2022-12-03 PROCEDURE — 97110 THERAPEUTIC EXERCISES: CPT

## 2022-12-03 PROCEDURE — 80048 BASIC METABOLIC PNL TOTAL CA: CPT | Performed by: HOSPITALIST

## 2022-12-03 NOTE — CM/SW NOTE
Department  asked to check status on appeal ref # [id number] called number [phone] (spoke w/  rep Alistair Cordoba at ~8:46 a.m.), per KONG (SELWYN)    Rep Shantel Henderson) stated Appeal # [id number] open till 12-4 ~ 1:31 for determination. Faxed add.  Clinicals to [id number]   December 03, 2022   09:03

## 2022-12-03 NOTE — CM/SW NOTE
Of note: 12/1  CM initiated expedited appeal  Expedited appeal called [phone]   Case QI#[id number] clinicals faxed to: [phone]     12/2: CM followed up on pending auth. Remained under review and was notified that the appeal could take up to 72 hours for response. KONG requested for [address] [place] [address] to f/up on appeal status. @9AM  Rep Mushtaq William) stated Appeal # [id number] open till 12-4 ~ 1:31 for determination. Faxed add. Clinicals to [phone]    PLAN: pending appeal - DC to [facility] vs tbd    SW remains available for support and/or discharge planning. Please do not hesitate to call/chat SW if further DC needs arise.      Michell FRIEND, [place], [place]   Ext [phone]

## 2022-12-03 NOTE — PLAN OF CARE
Patient is a&ox1, has been resting in bed w/ call light within reach. Is a max assist. Currently states she has mild pain. Is tolerating diet, is AC&HS. Is voiding using purewick. Skin tear to sacrum, xerofoam, 4x4, and mepilex applied. Is on Lovenox and SCDs for dvt prophylaxis. DC plan pending auth/appeal.      Problem: Patient Centered Care  Goal: Patient preferences are identified and integrated in the patient's plan of care  Description: Interventions:  - What would you like us to know as we care for you?  I was  for a long time   - Provide timely, complete, and accurate information to patient/family  - Incorporate patient and family knowledge, values, beliefs, and cultural backgrounds into the planning and delivery of care  - Encourage patient/family to participate in care and decision-making at the level they choose  - Honor patient and family perspectives and choices  Outcome: Progressing     Problem: Patient/Family Goals  Goal: Patient/Family Long Term Goal  Description: Patient's Long Term Goal: to no longer have pain to LLE     Interventions:  - pain medications  - rehab    - See additional Care Plan goals for specific interventions  Outcome: Progressing  Goal: Patient/Family Short Term Goal  Description: Patient's Short Term Goal: to go to rehab    Interventions:   - follow plan of care  - See additional Care Plan goals for specific interventions  Outcome: Progressing     Problem: Diabetes/Glucose Control  Goal: Glucose maintained within prescribed range  Description: INTERVENTIONS:  - Monitor Blood Glucose as ordered  - Assess for signs and symptoms of hyperglycemia and hypoglycemia  - Administer ordered medications to maintain glucose within target range  - Assess barriers to adequate nutritional intake and initiate nutrition consult as needed  - Instruct patient on self management of diabetes  Outcome: Progressing     Problem: PAIN - ADULT  Goal: Verbalizes/displays adequate comfort level or patient's stated pain goal  Description: INTERVENTIONS:  - Encourage pt to monitor pain and request assistance  - Assess pain using appropriate pain scale  - Administer analgesics based on type and severity of pain and evaluate response  - Implement non-pharmacological measures as appropriate and evaluate response  - Consider cultural and social influences on pain and pain management  - Manage/alleviate anxiety  - Utilize distraction and/or relaxation techniques  - Monitor for opioid side effects  - Notify MD/LIP if interventions unsuccessful or patient reports new pain  - Anticipate increased pain with activity and pre-medicate as appropriate  Outcome: Progressing     Problem: SAFETY ADULT - FALL  Goal: Free from fall injury  Description: INTERVENTIONS:  - Assess pt frequently for physical needs  - Identify cognitive and physical deficits and behaviors that affect risk of falls.   - [place] fall precautions as indicated by assessment.  - Educate pt/family on patient safety including physical limitations  - Instruct pt to call for assistance with activity based on assessment  - Modify environment to reduce risk of injury  - Provide assistive devices as appropriate  - Consider OT/PT consult to assist with strengthening/mobility  - Encourage toileting schedule  Outcome: Progressing

## 2022-12-03 NOTE — PHYSICAL THERAPY NOTE
PHYSICAL THERAPY HIP TREATMENT NOTE - INPATIENT    Room Number: 405/405-A            Presenting Problem: Fall resulting in closed fracture of L hip requiring IM nail fixation on 11/27/22       Problem List  Principal Problem:    Closed fracture of left hip, initial encounter ([address]      PHYSICAL THERAPY ASSESSMENT     RN approved PT session. Therapist donned with PPE; mask and gloves and pt without mask. Pt in supine and instructed on importance of mobility. Pt stated she is afraid to move and likes to stay in bed not moving. Engaged pt in supine thera exs with ble's to promote functional mobility. Pt with AROM RLE and AAROM LLE. Provided pt with time and explanation of PT role focus on bed mobility supine to sit towards to L side of bed. Pt required max A x2 for supine to sit and max A x 2 for sitting virgil. Pt was screaming during transfers and needs to be placed back to bed. Pt reposition in bed with all needs in reach with depended. educated pt in importance of sitting virgil but pt still screaming. Rn aware of pt progress. The patient's Approx Degree of Impairment: 92.36% has been calculated based on documentation in the [facility] '6 clicks' Inpatient Basic Mobility Short Form. Research supports that patients with this level of impairment may benefit from sub-acute rehabilitation. DISCHARGE RECOMMENDATIONS  PT Discharge Recommendations: Sub-acute rehabilitation    PLAN  PT Treatment Plan: Bed mobility; Body mechanics; Patient education;Strengthening;Balance training;Transfer training  Frequency (Obs): Daily    SUBJECTIVE  I am afraid. OBJECTIVE  Precautions: Hard of hearing;Limb alert - left    WEIGHT BEARING RESTRICTION           L Lower Extremity: Weight Bearing as Tolerated    PAIN ASSESSMENT   Rating: Unable to rate  Location: L hip area  Management Techniques:  Body mechanics;Breathing techniques;Relaxation;Repositioning    BALANCE  Static Sitting: Poor  Dynamic Sitting: Poor -  Static Standing: Not tested  Dynamic Standing: Not tested  ACTIVITY TOLERANCE                         O2 WALK       AM-PAC '6-Clicks' INPATIENT SHORT FORM - BASIC MOBILITY  How much difficulty does the patient currently have. .. Patient Difficulty: Turning over in bed (including adjusting bedclothes, sheets and blankets)?: A Lot   Patient Difficulty: Sitting down on and standing up from a chair with arms (e.g., wheelchair, bedside commode, etc.): Unable   Patient Difficulty: Moving from lying on back to sitting on the side of the bed?: Unable   How much help from another person does the patient currently need. .. Help from Another: Moving to and from a bed to a chair (including a wheelchair)?: Total   Help from Another: Need to walk in hospital room?: Total   Help from Another: Climbing 3-5 steps with a railing?: Total     AM-PAC Score:  Raw Score: 7   Approx Degree of Impairment: 92.36%   Standardized Score (AM-PAC Scale): 26.42   CMS Modifier (G-Code): CM    FUNCTIONAL ABILITY STATUS  Functional Mobility/Gait Assessment  Gait Assistance: Not tested    Additional Information:     Exercises  PM Session   Ankle Pumps      10 reps   Quad Sets  10 reps   Glut Sets  10 reps   Hip Abd/Add  10 reps   Heel slides  10 reps   Saq  0 reps   Sitting Knee Extension  0 reps   Standing heel/toe raises  0 reps   Hamstring Curls  0 reps   Forward, back steps  0 reps   Short Squats  0 reps     Patient End of Session: In bed; With [facility] staff;Needs met;Call light within reach;RN aware of session/findings; All patient questions and concerns addressed    CURRENT GOALS   Patient Goal Patient's self-stated goal is: return to PLOF   Goal #1 Patient is able to demonstrate supine - sit EOB @ level: moderate assistance     Goal #1   Current Status Max ax2   Goal #2 Patient will tolerate sitting EOB for 10 minutes requiring SBA to maintain balance to improve sitting tolerance and prepare for future transfers   Goal #2  Current Status  NT Sitting on EOB for 1-2 min with max A x2 to maintain upright posture   Goal #3 Patient is able to demonstrate transfers EOB to/from Chair/Wheelchair at assistance level: maximum assistance with walker - rolling     Goal #3   Current Status NT   Goal #4 Patient to demonstrate independence with home activity/exercise instructions provided to patient in preparation for discharge.    Goal #4   Current Status In progress

## 2022-12-03 NOTE — PLAN OF CARE
POD 5 Alert to self. On room air. Vital signs stable. purewick in place for incontinence. Complaints of pain relieved with prn norco and scheduled tylenol. Takes oral meds crushed with applesauce. Plan is for rehab pending auth. Bed is low and locked. Side rails up times two. Call light is within reach. Hourly rounding continued. Safety precautions maintained. Alarm on and audible.      Problem: Patient Centered Care  Goal: Patient preferences are identified and integrated in the patient's plan of care  Description: Interventions:  - Provide timely, complete, and accurate information to patient/family  - Incorporate patient and family knowledge, values, beliefs, and cultural backgrounds into the planning and delivery of care  - Encourage patient/family to participate in care and decision-making at the level they choose  - Honor patient and family perspectives and choices  Outcome: Progressing      Problem: Diabetes/Glucose Control  Goal: Glucose maintained within prescribed range  Description: INTERVENTIONS:  - Monitor Blood Glucose as ordered  - Assess for signs and symptoms of hyperglycemia and hypoglycemia  - Administer ordered medications to maintain glucose within target range  - Assess barriers to adequate nutritional intake and initiate nutrition consult as needed  - Instruct patient on self management of diabetes  Outcome: Progressing     Problem: PAIN - ADULT  Goal: Verbalizes/displays adequate comfort level or patient's stated pain goal  Description: INTERVENTIONS:  - Encourage pt to monitor pain and request assistance  - Assess pain using appropriate pain scale  - Administer analgesics based on type and severity of pain and evaluate response  - Implement non-pharmacological measures as appropriate and evaluate response  - Consider cultural and social influences on pain and pain management  - Manage/alleviate anxiety  - Utilize distraction and/or relaxation techniques  - Monitor for opioid side effects  - Notify MD/LIP if interventions unsuccessful or patient reports new pain  - Anticipate increased pain with activity and pre-medicate as appropriate  Outcome: Progressing     Problem: SAFETY ADULT - FALL  Goal: Free from fall injury  Description: INTERVENTIONS:  - Assess pt frequently for physical needs  - Identify cognitive and physical deficits and behaviors that affect risk of falls.   - [place] fall precautions as indicated by assessment.  - Educate pt/family on patient safety including physical limitations  - Instruct pt to call for assistance with activity based on assessment  - Modify environment to reduce risk of injury  - Provide assistive devices as appropriate  - Consider OT/PT consult to assist with strengthening/mobility  - Encourage toileting schedule  Outcome: Progressing

## 2022-12-04 LAB
GLUCOSE BLDC GLUCOMTR-MCNC: 131 MG/DL (ref 70–99)
GLUCOSE BLDC GLUCOMTR-MCNC: 136 MG/DL (ref 70–99)
GLUCOSE BLDC GLUCOMTR-MCNC: 166 MG/DL (ref 70–99)
GLUCOSE BLDC GLUCOMTR-MCNC: 194 MG/DL (ref 70–99)

## 2022-12-04 PROCEDURE — 82962 GLUCOSE BLOOD TEST: CPT

## 2022-12-04 PROCEDURE — 97530 THERAPEUTIC ACTIVITIES: CPT

## 2022-12-04 NOTE — PLAN OF CARE
POD 6 Alert to self. On room air. Vital signs stable. purewick in place for incontinence. Skin tear to the sacrum, cleaned and changed. Complaints of pain relieved with prn norco and scheduled tylenol. Takes oral meds crushed with applesauce. Pt had difficulty sleeping, received melatoning and norco for pain. Was not able to fall asleep until 0200. Plan is for rehab pending appeal that is under review. Bed is low and locked. Side rails up times two. Call light is within reach. Hourly rounding continued. Safety precautions maintained. Alarm on and audible.      Problem: Patient Centered Care  Goal: Patient preferences are identified and integrated in the patient's plan of care    Description: Interventions:  - Provide timely, complete, and accurate information to patient/family  - Incorporate patient and family knowledge, values, beliefs, and cultural backgrounds into the planning and delivery of care  - Encourage patient/family to participate in care and decision-making at the level they choose  - Honor patient and family perspectives and choices  Outcome: Progressing     Problem: Diabetes/Glucose Control  Goal: Glucose maintained within prescribed range  Description: INTERVENTIONS:  - Monitor Blood Glucose as ordered  - Assess for signs and symptoms of hyperglycemia and hypoglycemia  - Administer ordered medications to maintain glucose within target range  - Assess barriers to adequate nutritional intake and initiate nutrition consult as needed  - Instruct patient on self management of diabetes  Outcome: Progressing     Problem: PAIN - ADULT  Goal: Verbalizes/displays adequate comfort level or patient's stated pain goal  Description: INTERVENTIONS:  - Encourage pt to monitor pain and request assistance  - Assess pain using appropriate pain scale  - Administer analgesics based on type and severity of pain and evaluate response  - Implement non-pharmacological measures as appropriate and evaluate response  - Consider cultural and social influences on pain and pain management  - Manage/alleviate anxiety  - Utilize distraction and/or relaxation techniques  - Monitor for opioid side effects  - Notify MD/LIP if interventions unsuccessful or patient reports new pain  - Anticipate increased pain with activity and pre-medicate as appropriate  Outcome: Progressing     Problem: SAFETY ADULT - FALL  Goal: Free from fall injury  Description: INTERVENTIONS:  - Assess pt frequently for physical needs  - Identify cognitive and physical deficits and behaviors that affect risk of falls.   - [place] fall precautions as indicated by assessment.  - Educate pt/family on patient safety including physical limitations  - Instruct pt to call for assistance with activity based on assessment  - Modify environment to reduce risk of injury  - Provide assistive devices as appropriate  - Consider OT/PT consult to assist with strengthening/mobility  - Encourage toileting schedule  Outcome: Progressing

## 2022-12-04 NOTE — PLAN OF CARE
Patient alert and oriented to person. Post-op day #6. Dressing in place to left leg. WBAT. VSS. Takes meds crushed with applesauce. Monitoring blood glucose levels per order. Tolerating diet. Voiding via purewick. Room air. SCDs and lovenox for DVT prophylaxis. Norco provided as needed for pain. Max lift assist. Encouraged frequent ambulation and use of incentive spirometer. Fall precautions maintained. Frequent rounding by nursing staff. Plan is [facility] pending bed availability.     Problem: Patient Centered Care  Goal: Patient preferences are identified and integrated in the patient's plan of care  Description: Interventions:  - What would you like us to know as we care for you?   - Provide timely, complete, and accurate information to patient/family  - Incorporate patient and family knowledge, values, beliefs, and cultural backgrounds into the planning and delivery of care  - Encourage patient/family to participate in care and decision-making at the level they choose  - Honor patient and family perspectives and choices  Outcome: Progressing     Problem: Patient/Family Goals  Goal: Patient/Family Long Term Goal  Description: Patient's Long Term Goal: pain mangement    Interventions:  - follow md orders  -take pain medication as needed  -non-pharmacologic therapy  -PT/OT  - See additional Care Plan goals for specific interventions  Outcome: Progressing  Goal: Patient/Family Short Term Goal  Description: Patient's Short Term Goal: to go home    Interventions:   - follow md orders  -monitor labs  -discharge planning  -update pt and family on plan of care  - See additional Care Plan goals for specific interventions  Outcome: Progressing     Problem: Diabetes/Glucose Control  Goal: Glucose maintained within prescribed range  Description: INTERVENTIONS:  - Monitor Blood Glucose as ordered  - Assess for signs and symptoms of hyperglycemia and hypoglycemia  - Administer ordered medications to maintain glucose within target range  - Assess barriers to adequate nutritional intake and initiate nutrition consult as needed  - Instruct patient on self management of diabetes  Outcome: Progressing     Problem: PAIN - ADULT  Goal: Verbalizes/displays adequate comfort level or patient's stated pain goal  Description: INTERVENTIONS:  - Encourage pt to monitor pain and request assistance  - Assess pain using appropriate pain scale  - Administer analgesics based on type and severity of pain and evaluate response  - Implement non-pharmacological measures as appropriate and evaluate response  - Consider cultural and social influences on pain and pain management  - Manage/alleviate anxiety  - Utilize distraction and/or relaxation techniques  - Monitor for opioid side effects  - Notify MD/LIP if interventions unsuccessful or patient reports new pain  - Anticipate increased pain with activity and pre-medicate as appropriate  Outcome: Progressing     Problem: SAFETY ADULT - FALL  Goal: Free from fall injury  Description: INTERVENTIONS:  - Assess pt frequently for physical needs  - Identify cognitive and physical deficits and behaviors that affect risk of falls.   - [place] fall precautions as indicated by assessment.  - Educate pt/family on patient safety including physical limitations  - Instruct pt to call for assistance with activity based on assessment  - Modify environment to reduce risk of injury  - Provide assistive devices as appropriate  - Consider OT/PT consult to assist with strengthening/mobility  - Encourage toileting schedule  Outcome: Progressing

## 2022-12-04 NOTE — CM/SW NOTE
Spoke w/ Mitul Patino w/ [facility] [place], [facility]. appeals ([phone]) regarding determination. Decision was overturned & approved, auth # [id number]. Per Mitul Patino, a letter will be faxed to the provider w/ approval but that department is closed on the weekend. Updates sent to [facility], provided [place] info via [employer]. Will wait to hear from [facility], they may require auth letter prior to admit. Also checking bed availability.     Kaye Chaudhary, [address]

## 2022-12-04 NOTE — PHYSICAL THERAPY NOTE
PHYSICAL THERAPY TREATMENT NOTE - INPATIENT     Room Number: 405/405-A       Presenting Problem: Fall resulting in closed fracture of L hip requiring IM nail fixation on 11/27/22    Problem List  Principal Problem:    Closed fracture of left hip, initial encounter ([address]      PHYSICAL THERAPY ASSESSMENT   Chart reviewed. RN Shashi Erazo approved participation in physical therapy. PPE worn by therapist: mask and gloves. Patient was not wearing a mask during session. Patient presented in bed with unable to rate pain. Patient with fair  progress towards goals during this session. Education provided on Physical therapy plan of care and physiological benefits of out of bed mobility. Patient with fair carryover. Pt is received in the bed and was cleared for therapy session. Pt was very guarded with all movement and activity. PCT was present and assisted with the session. Pt is max A x2 with long sitting and bed mobility for sling placement for the overhead lift. Pt yells out due to increased pain and cognitive deficits. Pt was 2 assist with the use of the overhead lift to the chair. Pt is repositioned and left in the chair with all needs within reach. Handed pt off to the PCT in the room. Reported to the RN on the status of the pt. Bed mobility: Max assist 1-2  Transfers: NT  Gait Assistance: Not tested                    Patient was left in bedside chair at end of session with all needs in reach. The patient's Approx Degree of Impairment: 86.62% has been calculated based on documentation in the [facility] '6 clicks' Inpatient Basic Mobility Short Form. Research supports that patients with this level of impairment may benefit from Subacute Rehab. RN aware of patient status post session. DISCHARGE RECOMMENDATIONS  PT Discharge Recommendations: Sub-acute rehabilitation     PLAN  PT Treatment Plan: Bed mobility; Body mechanics; Coordination; Endurance; Patient education;Gait training;Strengthening;Transfer training;Balance training    SUBJECTIVE  Pt was agreeable to therapy session. OBJECTIVE  Precautions: Hard of hearing;Limb alert - left    WEIGHT BEARING RESTRICTION  Weight Bearing Restriction: L lower extremity           L Lower Extremity: Weight Bearing as Tolerated    PAIN ASSESSMENT   Rating: Unable to rate  Location: L hip  Management Techniques: Activity promotion; Body mechanics; Relaxation;Repositioning    BALANCE                                                                                                                       Static Sitting: Poor -  Dynamic Sitting: Poor -           Static Standing: Not tested  Dynamic Standing: Not tested    ACTIVITY TOLERANCE                         O2 WALK       AM-PAC '6-Clicks' INPATIENT SHORT FORM - BASIC MOBILITY  How much difficulty does the patient currently have. .. Patient Difficulty: Turning over in bed (including adjusting bedclothes, sheets and blankets)?: A Lot   Patient Difficulty: Sitting down on and standing up from a chair with arms (e.g., wheelchair, bedside commode, etc.): Unable   Patient Difficulty: Moving from lying on back to sitting on the side of the bed?: A Lot   How much help from another person does the patient currently need. .. Help from Another: Moving to and from a bed to a chair (including a wheelchair)?: Total   Help from Another: Need to walk in hospital room?: Total   Help from Another: Climbing 3-5 steps with a railing?: Total     AM-PAC Score:  Raw Score: 8   Approx Degree of Impairment: 86.62%   Standardized Score (AM-PAC Scale): 28.58   CMS Modifier (G-Code): CM      Patient End of Session: Up in chair;Needs met;Call light within reach;RN aware of session/findings; All patient questions and concerns addressed; With [facility] staff    CURRENT GOALS     Goals to be met by: 12/12/22  Patient Goal Patient's self-stated goal is: return to PLOF   Goal #1 Patient is able to demonstrate supine - sit EOB @ level: moderate assistance     Goal #1   Current Status Max A 1-2 rolling and long sitting. Goal #2 Patient will tolerate sitting EOB for 10 minutes requiring SBA to maintain balance to improve sitting tolerance and prepare for future transfers   Goal #2  Current Status NT    Goal #3 Patient is able to demonstrate transfers EOB to/from Chair/Wheelchair at assistance level: maximum assistance with walker - rolling     Goal #3   Current Status Lift to the chair with overhead lift   Goal #4 Patient to demonstrate independence with home activity/exercise instructions provided to patient in preparation for discharge.    Goal #4   Current Status IN PROGRESS

## 2022-12-05 VITALS
SYSTOLIC BLOOD PRESSURE: 111 MMHG | HEART RATE: 91 BPM | RESPIRATION RATE: 16 BRPM | HEIGHT: 63 IN | DIASTOLIC BLOOD PRESSURE: 89 MMHG | WEIGHT: 139.13 LBS | TEMPERATURE: 98 F | BODY MASS INDEX: 24.65 KG/M2 | OXYGEN SATURATION: 98 %

## 2022-12-05 LAB
GLUCOSE BLDC GLUCOMTR-MCNC: 102 MG/DL (ref 70–99)
GLUCOSE BLDC GLUCOMTR-MCNC: 114 MG/DL (ref 70–99)
SARS-COV-2 RNA RESP QL NAA+PROBE: NOT DETECTED

## 2022-12-05 PROCEDURE — 90471 IMMUNIZATION ADMIN: CPT

## 2022-12-05 PROCEDURE — 82962 GLUCOSE BLOOD TEST: CPT

## 2022-12-05 NOTE — CM/SW NOTE
12/05/22 0900   Discharge disposition   Expected discharge disposition Skilled Nurs   [address] [facility] Provider [facility]   Discharge transportation [address]     Pt discussed during nursing rounds. Pt is stable for dc today. MD dc order entered. Bed available at [facility] verification has been received. RN  to call report: [phone]  Rapid Covid Needed. Order entered. RN aware to collect. .    Plan: OBC today via [address] @ 1pm. PCS done. Pt/ Family updated on dc time via telephone call by ELENA Rodriguez. / to remain available for support and/or discharge planning. Christian Morfin.  Rosalia Starkey RN, BSN  Nurse   [phone]

## 2022-12-05 NOTE — PLAN OF CARE
Patient alert and oriented to person. Post-op day #7. Dressing in place to left leg. WBAT. VSS. Takes meds crushed with applesauce. Monitoring blood glucose levels per order. Tolerating diet. Voiding via purewick. Room air. SCDs and lovenox for DVT prophylaxis. Norco provided as needed for pain. Max lift assist. Encouraged frequent ambulation and use of incentive spirometer. Fall precautions maintained. Frequent rounding by nursing staff. Patient cleared by internal medicine, ortho surgery, PT/OT, and social work. Going to [facility] via ambulance. IV removed, discharge education provided, patient sent home with all personal belongings, scripts, and discharge instructions. Addressed additional questions. This RN called [facility] to provided report, no answer; left a message.     Problem: Patient Centered Care  Goal: Patient preferences are identified and integrated in the patient's plan of care  Description: Interventions:  - What would you like us to know as we care for you?   - Provide timely, complete, and accurate information to patient/family  - Incorporate patient and family knowledge, values, beliefs, and cultural backgrounds into the planning and delivery of care  - Encourage patient/family to participate in care and decision-making at the level they choose  - Honor patient and family perspectives and choices  Outcome: Adequate for Discharge     Problem: Patient/Family Goals  Goal: Patient/Family Long Term Goal  Description: Patient's Long Term Goal: pain management    Interventions:  - follow md orders  -take pain medication as needed  -non-pharmacologic therapy  -PT/OT  - See additional Care Plan goals for specific interventions  Outcome: Adequate for Discharge  Goal: Patient/Family Short Term Goal  Description: Patient's Short Term Goal: to go home    Interventions:   - follow md orders  -monitor labs  -discharge planning  -update pt and family on plan of care  - See additional Care Plan goals for specific interventions  Outcome: Adequate for Discharge     Problem: Diabetes/Glucose Control  Goal: Glucose maintained within prescribed range  Description: INTERVENTIONS:  - Monitor Blood Glucose as ordered  - Assess for signs and symptoms of hyperglycemia and hypoglycemia  - Administer ordered medications to maintain glucose within target range  - Assess barriers to adequate nutritional intake and initiate nutrition consult as needed  - Instruct patient on self management of diabetes  Outcome: Adequate for Discharge     Problem: PAIN - ADULT  Goal: Verbalizes/displays adequate comfort level or patient's stated pain goal  Description: INTERVENTIONS:  - Encourage pt to monitor pain and request assistance  - Assess pain using appropriate pain scale  - Administer analgesics based on type and severity of pain and evaluate response  - Implement non-pharmacological measures as appropriate and evaluate response  - Consider cultural and social influences on pain and pain management  - Manage/alleviate anxiety  - Utilize distraction and/or relaxation techniques  - Monitor for opioid side effects  - Notify MD/LIP if interventions unsuccessful or patient reports new pain  - Anticipate increased pain with activity and pre-medicate as appropriate  Outcome: Adequate for Discharge     Problem: SAFETY ADULT - FALL  Goal: Free from fall injury  Description: INTERVENTIONS:  - Assess pt frequently for physical needs  - Identify cognitive and physical deficits and behaviors that affect risk of falls.   - [place] fall precautions as indicated by assessment.  - Educate pt/family on patient safety including physical limitations  - Instruct pt to call for assistance with activity based on assessment  - Modify environment to reduce risk of injury  - Provide assistive devices as appropriate  - Consider OT/PT consult to assist with strengthening/mobility  - Encourage toileting schedule  Outcome: Adequate for Discharge

## 2022-12-05 NOTE — OCCUPATIONAL THERAPY NOTE
Per chart, pt will be transferred today to [address]. This therapist contacted RN and RN confirmed that pt is indeed being picked up at this time. DC IP OT.       Lanie Mccullough, OTR/L  [address]

## 2022-12-06 NOTE — PAYOR COMM NOTE
Discharge Notification    Patient Name: Jennine Goodell: Pippa Loza #: [id number]  Authorization Number: [id number]  Admit Date/Time: 11/26/2022 8:29 AM  Discharge Date/Time: 12/5/2022 2:17 PM

## 2022-12-08 ENCOUNTER — EXTERNAL FACILITY (OUTPATIENT)
Dept: INTERNAL MEDICINE CLINIC | Facility: CLINIC | Age: 87
End: 2022-12-08

## 2022-12-08 DIAGNOSIS — F32.4 MAJOR DEPRESSIVE DISORDER WITH SINGLE EPISODE, IN PARTIAL REMISSION (HCC): ICD-10-CM

## 2022-12-08 DIAGNOSIS — I50.32 CHRONIC HEART FAILURE WITH PRESERVED EJECTION FRACTION (HCC): ICD-10-CM

## 2022-12-08 DIAGNOSIS — S72.002A CLOSED FRACTURE OF LEFT HIP, INITIAL ENCOUNTER (HCC): ICD-10-CM

## 2022-12-08 NOTE — PROGRESS NOTES
Seen 12/7/2022  HPI The patient is a [address] female who presented to [facility] after a fall when she landed to her left hip and sustained left hip fracture. The patient was evaluated in the ER. She was found to have intertrochanteric hip fracture and she was evaluated by ortho service. She  underwent intramedullary nailing. Postoperatively, the patient was admitted to the ortho floor. She started in physical therapy and occupational therapy. PMHx: GERD, diabetes mellitus, hypertension, cholecystectomy, inguinal hernia repair. Allergies: cefuroxime; phenazopyridine    FHx: Reviewed, Noncontributory for liver, kidney, or lung disease. SHx: The patient lives in assisted living memory care in [place]. She does not smoke and does not drink alcohol.     Review of Systems:  she is doing ok, she came yesterday , pain is ok          On exam  Constitutional: well developed well nourished, not in any distress  Neuro: Alert, awake, cooperative, follows simple commands, forgetful  HEENT: NAD  CV:S1,S2, no MGR,Regular HR  Resp:clear on auscultation bilaterally, respirations unlabored,no adventitious lung sounds  Abdomen: soft, non-tender, non distended, +bowel sound all quadrants  : No bladder distension,no pelvic tenderness  MUS: left surgical hip,moves all extremities, right elbow depended edema+  Skin: skin color, texture, turgor normal    A/P    Status post fall/ Left hip displaced intertrochanteric fracture  - status post long intramedullary nailing insertion  - on lovenox  -Follow up with Karlie Talbot MD,Tuesday Dec 13, 2022    Cough with oral fluids/Dysphagia  - on thin liquids  speech follow up , risk for aspiration    Postoperative anemia  -hgb/hct-8.1/26.3  monitor cbc    GERD  -on protonix      CHF  -on lasix      CKD  -Monito    History of

## 2022-12-12 ENCOUNTER — EXTERNAL FACILITY (OUTPATIENT)
Dept: INTERNAL MEDICINE CLINIC | Facility: CLINIC | Age: 87
End: 2022-12-12

## 2022-12-12 DIAGNOSIS — D50.8 OTHER IRON DEFICIENCY ANEMIA: ICD-10-CM

## 2022-12-12 DIAGNOSIS — S72.002A CLOSED FRACTURE OF LEFT HIP, INITIAL ENCOUNTER (HCC): ICD-10-CM

## 2022-12-12 DIAGNOSIS — I50.32 CHRONIC HEART FAILURE WITH PRESERVED EJECTION FRACTION (HCC): ICD-10-CM

## 2022-12-12 NOTE — PROGRESS NOTES
follow up  PMHx: GERD, diabetes mellitus, hypertension, cholecystectomy, inguinal hernia repair. Allergies: cefuroxime; phenazopyridine    FHx: Reviewed, Noncontributory for liver, kidney, or lung disease. SHx: The patient lives in assisted living memory care in [place]. She does not smoke and does not drink alcohol.     Review of Systems: she has upset stomach , doesnt want to eat           On exam  Constitutional: well developed well nourished, not in any distress  Neuro: Alert, awake, cooperative, follows simple commands, forgetful  HEENT: NAD  CV:S1,S2, no MGR,Regular HR  Resp:clear on auscultation bilaterally, respirations unlabored,no adventitious lung sounds  Abdomen: soft, non-tender, non distended, +bowel sound all quadrants  : No bladder distension,no pelvic tenderness  MUS: left surgical hip,moves all extremities, right elbow depended edema+  Skin: skin color, texture, turgor normal    A/P  nausea/ zofran prn   Status post fall/ Left hip displaced intertrochanteric fracture  - status post long intramedullary nailing insertion  - on lovenox  -Follow up with Joan Turcios MD,tomorrow     Cough with oral fluids/Dysphagia  - on thin liquids  speech follow up , risk for aspiration    Postoperative anemia  -hgb/hct-8.1/26.3  hb noted , stable    GERD  -on protonix      CHF  -on lasix      CKD  -Monito    History of breast cancer with lumpectomy and radiation therapy  -ptot

## 2022-12-14 ENCOUNTER — EXTERNAL FACILITY (OUTPATIENT)
Dept: INTERNAL MEDICINE CLINIC | Facility: CLINIC | Age: 87
End: 2022-12-14

## 2022-12-14 DIAGNOSIS — I50.32 CHRONIC HEART FAILURE WITH PRESERVED EJECTION FRACTION (HCC): ICD-10-CM

## 2022-12-14 DIAGNOSIS — S72.002A CLOSED FRACTURE OF LEFT HIP, INITIAL ENCOUNTER (HCC): ICD-10-CM

## 2022-12-14 DIAGNOSIS — E11.42 DIABETIC POLYNEUROPATHY ASSOCIATED WITH TYPE 2 DIABETES MELLITUS (HCC): ICD-10-CM

## 2022-12-14 PROCEDURE — 99308 SBSQ NF CARE LOW MDM 20: CPT | Performed by: INTERNAL MEDICINE

## 2022-12-14 PROCEDURE — 1111F DSCHRG MED/CURRENT MED MERGE: CPT | Performed by: INTERNAL MEDICINE

## 2022-12-14 NOTE — PROGRESS NOTES
follow up  PMHx: GERD, diabetes mellitus, hypertension, cholecystectomy, inguinal hernia repair. Allergies: cefuroxime; phenazopyridine    FHx: Reviewed, Noncontributory for liver, kidney, or lung disease. SHx: The patient lives in assisted living memory care in [place]. She does not smoke and does not drink alcohol.     Review of Systems: she is doing ok, back pain from laying down         On exam  Neuro: Alert, awake, cooperative, follows simple commands, forgetful  HEENT: NAD  CV:S1,S2, no MGR,Regular HR  Resp:clear on auscultation bilaterally, respirations unlabored,no adventitious lung sounds  Abdomen: soft, non-tender, non distended, +bowel sound all quadrants  : No bladder distension,no pelvic tenderness  MUS: left surgical hip,moves all extremities, right elbow depended edema+  Skin: skin color, texture, turgor normal    A/P  n  Status post fall/ Left hip displaced intertrochanteric fracture  - status post long intramedullary nailing insertion  - on lovenox  ptot    back pain / lidocaine patch, most likely positional    Cough with oral fluids/Dysphagia  - on thin liquids  speech follow up , risk for aspiration    Postoperative anemia  -hgb/hct-8.1/26.3  hb noted , stable    GERD  -on protonix      CHF  -on lasix      CKD  -Monito    History of breast cancer with lumpectomy and radiation therapy  -ptot

## 2022-12-19 PROCEDURE — 1111F DSCHRG MED/CURRENT MED MERGE: CPT | Performed by: INTERNAL MEDICINE

## 2022-12-19 PROCEDURE — 99308 SBSQ NF CARE LOW MDM 20: CPT | Performed by: INTERNAL MEDICINE

## 2022-12-20 ENCOUNTER — EXTERNAL FACILITY (OUTPATIENT)
Dept: INTERNAL MEDICINE CLINIC | Facility: CLINIC | Age: 87
End: 2022-12-20

## 2022-12-20 DIAGNOSIS — I50.32 CHRONIC HEART FAILURE WITH PRESERVED EJECTION FRACTION (HCC): ICD-10-CM

## 2022-12-20 DIAGNOSIS — S72.002A CLOSED FRACTURE OF LEFT HIP, INITIAL ENCOUNTER (HCC): ICD-10-CM

## 2022-12-21 PROCEDURE — 1111F DSCHRG MED/CURRENT MED MERGE: CPT | Performed by: INTERNAL MEDICINE

## 2022-12-21 PROCEDURE — 99309 SBSQ NF CARE MODERATE MDM 30: CPT | Performed by: INTERNAL MEDICINE

## 2022-12-21 NOTE — PROGRESS NOTES
follow up  seen 12/19/2022     GERD, diabetes mellitus, hypertension, cholecystectomy, inguinal hernia repair. Allergies: cefuroxime; phenazopyridine    FHx: Reviewed, Noncontributory for liver, kidney, or lung disease. SHx: The patient lives in assisted living memory care in [place]. She does not smoke and does not drink alcohol.     Review of Systems: she is doing better ,        On exam  Neuro: Alert, awake, cooperative, follows simple commands, forgetful  HEENT: NAD  CV:S1,S2, no MGR,Regular HR  Resp:clear on auscultation bilaterally, respirations unlabored,no adventitious lung sounds  Abdomen: soft, non-tender, non distended, +bowel sound all quadrants  : No bladder distension,no pelvic tenderness  MUS: left surgical hip,moves all extremities, right elbow depended edema+  Skin: skin color, texture, turgor normal    A/P  n  Status post fall/ Left hip displaced intertrochanteric fracture  - status post long intramedullary nailing insertion  - on lovenox  continue with ptot        Cough with oral fluids/Dysphagia  - on thin liquids  speech is following,      Postoperative anemia    hb noted , stable  ptot

## 2022-12-22 ENCOUNTER — EXTERNAL FACILITY (OUTPATIENT)
Dept: INTERNAL MEDICINE CLINIC | Facility: CLINIC | Age: 87
End: 2022-12-22

## 2022-12-22 DIAGNOSIS — S72.002A CLOSED FRACTURE OF LEFT HIP, INITIAL ENCOUNTER (HCC): ICD-10-CM

## 2022-12-22 DIAGNOSIS — E11.42 DIABETIC POLYNEUROPATHY ASSOCIATED WITH TYPE 2 DIABETES MELLITUS (HCC): ICD-10-CM

## 2022-12-22 DIAGNOSIS — I50.32 CHRONIC HEART FAILURE WITH PRESERVED EJECTION FRACTION (HCC): ICD-10-CM

## 2022-12-22 DIAGNOSIS — E86.0 DEHYDRATION: ICD-10-CM

## 2022-12-22 NOTE — PROGRESS NOTES
follow up    seen 12/21/2022  GERD, diabetes mellitus, hypertension, cholecystectomy, inguinal hernia repair. Allergies: cefuroxime; phenazopyridine    FHx: Reviewed, Noncontributory for liver, kidney, or lung disease. SHx: The patient lives in assisted living memory care in [place]. She does not smoke and does not drink alcohol. Review of Systems: she is weak , per nurse poor appetite       On exam  Neuro: Alert, awake, cooperative, follows simple commands, forgetful  HEENT: NAD  CV:S1,S2, no MGR,Regular HR  Resp:clear on auscultation bilaterally, respirations unlabored,no adventitious lung sounds  Abdomen: soft, non-tender, non distended, +bowel sound all quadrants  : No bladder distension,no pelvic tenderness  MUS: left surgical hip,moves all extremities, right elbow depended edema+  Skin: skin color, texture, turgor normal    A/P  poor appetite / dehydration, iv fluids   cough .  cxr reviewed, start abx , speech is following   n  Status post fall/ Left hip displaced intertrochanteric fracture  - status post long intramedullary nailing insertion  - on lovenox  continue with ptot        Postoperative anemia    hb noted , stable  ptot  GERD  -on protonix      CHF  -on lasix      CKD  -Monito    History of breast cancer with lumpectomy and radiation therapy

## 2024-04-07 NOTE — CONSULTS
[facility] Infectious Disease Consult    Dorothy Rivers Patient Status:  Inpatient    1920 MRN [id number]   Location [facility] 5SW/SE Attending Ajith Emery MD   Hosp Day # 0 PCP Chris Quintanilla MD     Reason for Consultat mg, Oral, Daily  •  melatonin tab TABS 3 mg, 3 mg, Oral, Nightly  •  Ondansetron HCl (ZOFRAN) tab 4 mg, 4 mg, Oral, Q6H PRN  •  [START ON 11/15/2019] famoTIDine (PEPCID) tab 20 mg, 20 mg, Oral, Daily  •  Senna-Docusate Sodium (SENOKOT S) 8.6-50 MG tab 1 ta pain.  Integument/breast: Negative for rash, skin lesions, and pruritus. Hematologic/lymphatic: Negative for easy bruising, bleeding, and lymphadenopathy. Musculoskeletal: Negative for myalgias, arthralgias, muscle weakness.   Neurological: Negative for h 11/14/2019     11/14/2019    K 3.4 11/14/2019     11/14/2019    CO2 26.0 11/14/2019     11/14/2019    CA 9.7 11/14/2019       Radiology:  Reviewed,       Cultures:  Reviewed,     Assessment and Plan:  Patient Active Problem List:     Aor - Patient found on fall by  prior to presentation  - Denies any acute bony pain  - CTH umremarkable  - TTE unremarkable  > orthostatics unremarkable

## (undated) DEVICE — APPLICATOR CHLORAPREP 26ML

## (undated) DEVICE — SUCTION CANISTER, 3000CC,SAFELINER: Brand: DEROYAL

## (undated) DEVICE — Device

## (undated) DEVICE — REAMER SHAFT, MOD.TRINKLE: Brand: BIXCUT

## (undated) DEVICE — SPONGE PREMIERPRO 7X18X18

## (undated) DEVICE — 3M™ MICROFOAM™ TAPE 1528-4: Brand: 3M™ MICROFOAM™

## (undated) DEVICE — DRILL, AO, STERILE

## (undated) DEVICE — SUT VICRYL 0 CP-1 J267H

## (undated) DEVICE — SOLUTION  .9 1000ML BTL

## (undated) DEVICE — GAMMEX® NON-LATEX PI ORTHO SIZE 8, STERILE POLYISOPRENE POWDER-FREE SURGICAL GLOVE: Brand: GAMMEX

## (undated) DEVICE — SUT VICRYL 2-0 FS-1 J443H

## (undated) DEVICE — DRESSING AQUACEL AG SP 3.5X10

## (undated) DEVICE — HIP PINNING: Brand: MEDLINE INDUSTRIES, INC.

## (undated) DEVICE — K-WIRE

## (undated) DEVICE — GAMMEX® PI HYBRID SIZE 7.5, STERILE POWDER-FREE SURGICAL GLOVE, POLYISOPRENE AND NEOPRENE BLEND: Brand: GAMMEX

## (undated) DEVICE — PROXIMATE SKIN STAPLERS (35 WIDE) CONTAINS 35 STAINLESS STEEL STAPLES (FIXED HEAD): Brand: PROXIMATE

## (undated) DEVICE — GUIDE WIRE, BALL-TIPPED, STERILE

## (undated) DEVICE — OCCLUSIVE GAUZE STRIP,3% BISMUTH TRIBROMOPHENATE IN PETROLATUM BLEND: Brand: XEROFORM

## (undated) DEVICE — DRAPE SHEET LARGE 76X55

## (undated) NOTE — LETTER
[address]  Authorization for Surgical Operation and Procedure                                                                                           1. I hereby authorize Joselyn Pink MD, my physician and his/her assistants (if applicable), which may include medical students, residents, and/or fellows, to perform the following surgical operation/ procedure and administer such anesthesia as may be determined necessary by my physician: Operation/Procedure name (s) [facility] on [address]   2. I recognize that during the surgical operation/procedure, unforeseen conditions may necessitate additional or different procedures than those listed above. I, therefore, further authorize and request that the above-named surgeon, assistants, or designees perform such procedures as are, in their judgment, necessary and desirable. 3.   My surgeon/physician has discussed prior to my surgery the potential benefits, risks and side effects of this procedure; the likelihood of achieving goals; and potential problems that might occur during recuperation. They also discussed reasonable alternatives to the procedure, including risks, benefits, and side effects related to the alternatives and risks related to not receiving this procedure. I have had all my questions answered and I acknowledge that no guarantee has been made as to the result that may be obtained. 4.   Should the need arise during my operation/procedure, which includes change of level of care prior to discharge, I also consent to the administration of blood and/or blood products. Further, I understand that despite careful testing and screening of blood or blood products by collecting agencies, I may still be subject to ill effects as a result of receiving a blood transfusion and/or blood products.   The following are some, but not all, of the potential risks that can occur: fever and allergic reactions, hemolytic reactions, transmission of diseases such as Hepatitis, AIDS and Cytomegalovirus (CMV) and fluid overload. In the event that I wish to have an autologous transfusion of my own blood, or a directed donor transfusion, I will discuss this with my physician. Check only if Refusing Blood or Blood Products  I understand refusal of blood or blood products as deemed necessary by my physician may have serious consequences to my condition to include possible death. I hereby assume responsibility for my refusal and release the hospital, its personnel, and my physicians from any responsibility for the consequences of my refusal.    o  Refuse   5. I authorize the use of any specimen, organs, tissues, body parts or foreign objects that may be removed from my body during the operation/procedure for diagnosis, research or teaching purposes and their subsequent disposal by hospital authorities. I also authorize the release of specimen test results and/or written reports to my treating physician on the hospital medical staff or other referring or consulting physicians involved in my care, at the discretion of the Pathologist or my treating physician. 6.   I consent to the photographing or videotaping of the operations or procedures to be performed, including appropriate portions of my body for medical, scientific, or educational purposes, provided my identity is not revealed by the pictures or by descriptive texts accompanying them. If the procedure has been photographed/videotaped, the surgeon will obtain the original picture, image, videotape or CD. The hospital will not be responsible for storage, release or maintenance of the picture, image, tape or CD.    7.   I consent to the presence of a  or observers in the operating room as deemed necessary by my physician or their designees.     8.   I recognize that in the event my procedure results in extended X-Ray/fluoroscopy time, I may develop a skin reaction. 9. If I have a Do Not Attempt Resuscitation (DNAR) order in place, that status will be suspended while in the operating room, procedural suite, and during the recovery period unless otherwise explicitly stated by me (or a person authorized to consent on my behalf). The surgeon or my attending physician will determine when the applicable recovery period ends for purposes of reinstating the DNAR order. 10. Patients having a sterilization procedure: I understand that if the procedure is successful the results will be permanent and it will therefore be impossible for me to inseminate, conceive, or bear children. I also understand that the procedure is intended to result in sterility, although the result has not been guaranteed. 11. I acknowledge that my physician has explained sedation/analgesia administration to me including the risk and benefits I consent to the administration of sedation/analgesia as may be necessary or desirable in the judgment of my physician. I CERTIFY THAT I HAVE READ AND FULLY UNDERSTAND THE ABOVE CONSENT TO OPERATION and/or OTHER PROCEDURE.     _________________________________________ _________________________________     ___________________________________  Signature of Patient     Signature of Responsible Person                   Printed Name of Responsible Person                              _________________________________________ ______________________________        ___________________________________  Signature of Witness         Date  Time         Relationship to Patient    STATEMENT OF PHYSICIAN My signature below affirms that prior to the time of the procedure; I have explained to the patient and/or his/her legal representative, the risks and benefits involved in the proposed treatment and any reasonable alternative to the proposed treatment.  I have also explained the risks and benefits involved in refusal of the proposed treatment and alternatives to the proposed treatment and have answered the patient's questions.  If I have a significant financial interest in a co-management agreement or a significant financial interest in any product or implant, or other significant relationship used in this procedure/surgery, I have disclosed this and had a discussion with my patient.     _______________________________________________________________ _____________________________  [facility] [employer])                                                                                         (Date)                                   (Time)  Patient Name: Angelita Burnett    : 1920   Printed: 2022      Medical Record #: [id number]                                              Page 1 of 1

## (undated) NOTE — IP AVS SNAPSHOT
[facility]            (For Outpatient Use Only) Initial Admit Date: 2022   Inpt/Obs Admit Date: Inpt: 22 / Obs: N/A   Discharge Date:    [address] Route:  [de-identified]   MRN: [de-identified]   CSN: [id number]   CEID: [id number]        ENCOUNTER  Patient Class: Inpatient Admitting Provider: No admitting provider for patient encounter. Unit: [address]//SE   Hospital Service: Ortho/Spine Attending Provider: Libia Curry MD   Bed: 405-A   Visit Type:   Referring Physician: No ref. provider found Billing Flag:    Admit Diagnosis: Closed fracture of left hip, initial encounter [facility]) Sue Strauss      PATIENT  Legal Name:   Noel Royal   Legal Sex: Female  Gender ID:              [address] [facility] Name:    PCP:  Louis Gage MD Home: [phone]   Address:  [address] [place] [address] APT* : 1920 (89 yrs) Mobile: No mobile phone on file. City/State/Zip: [place] [address] Marital: Single Language: [place] park: [place] SSN4: [id number] Taoist: [address] Not [place] Child*     Race: White Ethnicity: Non  Or  O*   EMERGENCY CONTACT   Name Relationship Legal Guardian? Home Phone Work Phone Mobile Phone   1.  Anuel Simon  2. Solo Tai         [phone]          GUARANTOR  Guarantor: Karey Patton : 1920 Home Phone: [phone]   Address: [address]  Sex: Female Work Phone:    City/State/Zip: [place], [address]   Rel. to Patient: Self Guarantor ID: [id number]   [address]   Employer:  Status: RETIRED     COVERAGE  PRIMARY INSURANCE   Payor: [facility]*   Group Number: [id number] Insurance Type: INDEMNITY   Subscriber Name: Rolly Goodell : 1920   Subscriber ID: [id number] Pt Rel to Subscriber: Self   SECONDARY INSURANCE   Payor:  Plan:    Group Number:  Insurance Type:    Subscriber Name:  Subscriber :    Subscriber ID:  Pt Rel to Subscriber:    TERTIARY INSURANCE   Payor: Plan:    Group Number:  Insurance Type:    Subscriber Name:  Subscriber :    Subscriber ID:  Pt Rel to Subscriber:    Hospital Account Financial Class: Medicare Advantage    2022

## (undated) NOTE — IP AVS SNAPSHOT
Patient Demographics     Address  [address] [place] [address]  [address] [facility] [address] Phone  [phone] [facility])      Emergency Contact(s)     Name Relation Home Work [place]  [phone]      Mary Bella   [phone]       Allergies as of 6/6 Chew 2 tablets by mouth every 6 (six) hours as needed for Heartburn. Ciprofloxacin HCl 500 MG Tabs  Commonly known as:  CIPRO  Next dose due:  6/7 tomorrow morning       Take 1 tablet (500 mg total) by mouth daily for 10 days.   Stop taking on:  6 [id number] acetaminophen (TYLENOL) tab 650 mg 06/05/19 1956 Given      [id number] furosemide (LASIX) injection 20 mg 06/05/19 1650 Given      [id number] furosemide (LASIX) injection 20 mg 06/06/19 0858 Given      [id number] ondansetron HCl (ZOFRAN) injection CO2 32.0 21.0 - 32.0 mmol/L — [facility]   Anion Gap 8 0 - 18 mmol/L — [facility]   BUN 15 7 - 18 mg/dL [facility]   Creatinine 0.94 0.55 - 1.02 mg/dL [facility]   BUN/CREA Ratio 16.0 10.0 - 20.0 — [facility]   Calcium, Total 8.7 8.5 - 10.1 m Piperacillin + Tazobactam <=4  Sensitive    Tobramycin <=1  Sensitive                   Blood Culture PCR Once [[id number]]  (Abnormal) Collected:  06/01/19 1150    Order Status:  Completed Lab Status:  Final result Updated:  06/06/19 1312    Specimen:  Bl PCP: Chrystal Ibanez MD    History of Present Illness: Patient is a 80year old female with PMH sig for[TN.1] HTN, GERD, DM (no longer on meds) who presents with fevers, and rigors.   Patient states this morning she woke up and felt ill, she was feve Smokeless tobacco: Never Used    Alcohol use: No       Fam Hx  History reviewed. No pertinent family history. Review of Systems  Comprehensive ROS reviewed and negative except for what's stated above. OBJECTIVE:  /57   Pulse 120   Temp 99. Patient is a 80year old female with PMH sig for[TN.1] HTN, GERD, DM (no longer on meds) who presents with fevers, and rigors. Severe sepsis / wound infection?  Vs other / lactic acidosis / leucocytosis   - Fever, Tachy, WBC14, LA 4.3, source unclear  - Consults signed by Taylor Jeong DO at 6/2/2019 10:48 AM     Author:  Taylor Jeong DO Service:  Infectious Disease Author Type:  Physician    Filed:  6/2/2019 10:48 AM Date of Service:  6/2/2019 10:07 AM Status:  Signed    :  Leatha Alanis Current Facility-Administered Medications:   •  [START ON 6/3/2019] Heparin Sodium (Porcine) 5000 UNIT/ML injection 5,000 Units, 5,000 Units, Subcutaneous, 2 times per day  •  Calcium Carbonate Antacid (TUMS) chewable tab 1,000 mg, 1,000 mg, Oral, TID PRN Remainder of 12 point review of systems otherwise negative.     Vital signs in last 24 hours:  Patient Vitals for the past 24 hrs:   BP Temp Temp src Pulse Resp SpO2 Height Weight   06/02/19 0900 121/88 — — 97 (!) 27 97 % — —   06/02/19 0800 (!) 137/115 98 Abdomen: Soft, NT/ND. Bowel sounds present. No organomegaly. Extremity: No edema. Skin: No rashes or lesions. Neurological: No focal neurologic deficits.     Lab Data Review:  Lab Results   Component Value Date    WBC 11.1 06/02/2019    HGB 9.3 06/ 8. Calcified/degenerated uterine fibroids. 9. Coronary and peripheral atherosclerosis. 10. Postsurgical changes of right proximal femoral internal fixation. Resultant streak artifacts limit assessment.   11. Chronic/healed left rib and left inferior pubi Filed:  6/4/2019  3:33 PM Date of Service:  6/4/2019  9:29 AM Status:  Signed    :  Billie Mittal PT (Physical Therapist)        PHYSICAL THERAPY EVALUATION - INPATIENT     Room Number: 198/281-S  Evaluation Date: 6/4/2019  Type of Evaluation: I calculated based on documentation in the [facility] '6 clicks' Inpatient Basic Mobility Short Form. Research supports that patients with this level of impairment may benefit from[ST. 1] LTAC[ST.3].[ST.1]    This therapist is recommending sub-acute rehab at [place] [address] • REMOVAL GALLBLADDER     • REPAIR ING HERNIA,5+Y/O,REDUCIBL[ST.2]         HOME SITUATION[ST. 1]  Type of Home: Assisted living facility([facility])   Home Layout: One level  Stairs to Enter : 0     Stairs to Bedroom: 0       Lives With: Staff 24 hour Gait Assistance: Not tested           Stoop/Curb Assistance: Not tested[ST. 2]       Bed Mobility:[ST.1] Mod A x 1-2[ST. 3]    Transfers:[ST.1] NT[ST.3]    Exercise/Education Provided:[ST.1]  Bed mobility[ST. 3]    Patient End of Session: In bed;Needs met; Tam Physician Order: IP Consult to Occupational Therapy  Reason for Therapy: ADL/IADL Dysfunction and Discharge Planning    OCCUPATIONAL THERAPY ASSESSMENT     Patient is a 80year old female admitted 6/1/2019 for acute respiratory failure. Sepsis.   In this OT based on documentation in the [facility] '6 clicks' Inpatient Daily Activity Short Form. Research supports that patients with this level of impairment may benefit from AUBRIE. Pt does not yet demonstrate the physical skills required to safely return to [facility].  Pt re bilateral shoulders --per observation     STRENGTH ASSESSMENT  Upper extremity strength is within functional limits except for the following; bilateral shoulders --per observation     [address] ‘6-Clicks’ Inpatient Daily Immunizations     Name Date      Pneumococcal (Prevnar 13) 01/26/18       Future Appointments        Provider Jong Pena    6/11/2019 1:30 PM [facility] [address] [facility]    6/13/2019 10:30 AM Juliette Romero MD [facility]

## (undated) NOTE — ED AVS SNAPSHOT
Ander Henderson   MRN: [id number]    Department:  [facility] Emergency Department   Date of Visit:  5/2/2019           Disclosure     Insurance plans vary and the physician(s) referred by the ER may not be covered by your plan.  Please contact you within the next three months to obtain basic health screening including reassessment of your blood pressure.     IF THERE IS ANY CHANGE OR WORSENING OF YOUR CONDITION, CALL YOUR PRIMARY CARE PHYSICIAN AT ONCE OR RETURN IMMEDIATELY TO THE EMERGENCY DEPARTMEN

## (undated) NOTE — IP AVS SNAPSHOT
[facility]            (For Outpatient Use Only) Initial Admit Date: 6/1/2019   Inpt/Obs Admit Date: Inpt: 6/1/19 / Obs: N/A   Discharge Date:    Sweetie Juarez:  [de-identified]   MRN: [de-identified]   CSN: [id number]   [facility]ID: [facility] Account Financial Class: Medicare Advantage    June 6, 2019

## (undated) NOTE — IP AVS SNAPSHOT
[facility]            (For Outpatient Use Only) Initial Admit Date: 11/4/2019   Inpt/Obs Admit Date: Inpt: 11/4/19 / Obs: N/A   Discharge Date:    Jackosn Higuera:  [de-identified]   MRN: [de-identified]   CSN: [id number]   CEID: [id number]        [facility] Account Financial Class: Medicare Advantage    November 7, 2019

## (undated) NOTE — LETTER
Theron Thomas [address], [place], [place]  [address]  INFORMED CONSENT FOR TRANSFUSION OF BLOOD OR BLOOD PRODUCTS  My physician has informed me of the nature, purpose, benefits and risks of transfusion for blood and blood components that he/she may deem necessary during my treatment or hospitalization. He/she has also discussed alternatives to receiving blood from the voluntary blood supply with me, such as self-donation (autologous) and directed donation (blood donated by family or friends to be used specifically for me). I further understand that while the [address] [place] [address] will attempt to supply any autologous or directed donor blood prior to transfusion of blood from the routine blood supply, medical circumstances may require that other or additional blood components may be required for my care. In giving consent, I acknowledge that my physician has also informed me that despite careful screening and testing in accordance with national and regional regulations, there is still a small risk of transmission of infectious agents including hepatitis, HIV-1/2, cytomegalovirus and other viruses or diseases as yet unknown for which licensed definitive screening tests do not currently exist. Additionally, my physician has informed me of the potential for transfusion reactions not related to an infectious agent. [  ]  Check here for Recurring Outpatient Transfusion Therapy (valid for 1 year) In addition to the above, my physician has informed me that I shall receive numerous transfusions over a period of time and that these can lead to other increased risks. I hereby authorize the transfusion of blood and/or blood products to me as deemed necessary and ordered by physicians participating in my care.  My physician has given me the opportunity to ask questions and any questions asked have been answered to my satisfaction  __________________________________________ ______________________________________________  (Signature of Patient)                                                            (Responsible party in case of Minor,                                                                                                 Incompetent, or unconscious Patient)  ___________________________________________       ________ ______________________________________  (Relationship to Patient)                                                       (Signature of Witness)  ______________________________________________________________________________________________   (Date)                                                                           (Time)  REFUSAL OF CONSENT FOR BLOOD TRANSFUSIONS   Sign only if Refusing   [  ] I understand refusal of blood or blood products as deemed necessary by my physician may have serious consequences to my condition to include possible death.  I hereby assume responsibility for my refusal and release the hospital, its personnel, and my physicians from any responsibility for the consequences of my refusal.    ________________________________________________________________________________  (Signature of Patient)                                                         (Responsible Party/Relationship to Patient)    ________________________________________________________________________________  (Signature of Witness)                                                       (Date/Time)     Patient Name: Tali Franklin     : 1920                 Printed: 2022      Medical Record #: [id number]                                 Page 1 of 1